# Patient Record
Sex: MALE | Race: WHITE | Employment: FULL TIME | URBAN - METROPOLITAN AREA
[De-identification: names, ages, dates, MRNs, and addresses within clinical notes are randomized per-mention and may not be internally consistent; named-entity substitution may affect disease eponyms.]

---

## 2017-07-07 ENCOUNTER — OFFICE VISIT (OUTPATIENT)
Dept: ORTHOPEDIC SURGERY | Age: 57
End: 2017-07-07

## 2017-07-07 VITALS
OXYGEN SATURATION: 97 % | RESPIRATION RATE: 18 BRPM | WEIGHT: 301 LBS | BODY MASS INDEX: 40.77 KG/M2 | TEMPERATURE: 98.2 F | HEART RATE: 80 BPM | DIASTOLIC BLOOD PRESSURE: 94 MMHG | SYSTOLIC BLOOD PRESSURE: 166 MMHG | HEIGHT: 72 IN

## 2017-07-07 DIAGNOSIS — M25.552 LEFT HIP PAIN: Primary | ICD-10-CM

## 2017-07-07 DIAGNOSIS — M51.36 DDD (DEGENERATIVE DISC DISEASE), LUMBAR: ICD-10-CM

## 2017-07-07 DIAGNOSIS — R29.898 LEFT LEG WEAKNESS: ICD-10-CM

## 2017-07-07 DIAGNOSIS — M79.2 NEURITIS: ICD-10-CM

## 2017-07-07 DIAGNOSIS — M47.816 LUMBAR FACET ARTHROPATHY: ICD-10-CM

## 2017-07-07 DIAGNOSIS — M54.50 PAIN OF LUMBAR SPINE: ICD-10-CM

## 2017-07-07 RX ORDER — GABAPENTIN 300 MG/1
CAPSULE ORAL
Qty: 120 CAP | Refills: 2 | Status: SHIPPED | OUTPATIENT
Start: 2017-07-07

## 2017-07-07 NOTE — PROGRESS NOTES
[facility]  [address] [place], [facility] [address]  Phone: [phone]  Fax: [phone]      ASSESSMENT   Marvin Erwin was seen today for back pain. Diagnoses and all orders for this visit:    Left hip pain  -     [74680] Hip Uni with Pelvis 2-3 Views    Pain of lumbar spine  -     [49021] Hip Uni with Pelvis 2-3 Views    DDD (degenerative disc disease), lumbar  -     MRI LUMB SPINE W WO CONT; Future    Lumbar facet arthropathy (HCC)  -     MRI LUMB SPINE W WO CONT; Future    Neuritis  -     MRI LUMB SPINE W WO CONT; Future  -     gabapentin (NEURONTIN) 300 mg capsule; Take 1-2 caps po q am and 2 caps po q pm as directed. Left leg weakness  -     MRI LUMB SPINE W WO CONT; Future         IMPRESSION AND PLAN:  Althea Kaiser is a 61 y.o. male with history of lumbar pain. He c/o pain in the lower back that radiates down the left leg to the foot. Pt admits that at his last office visit in 2009 he experienced pain in the right leg. He reports that occasionally his left knee feels like its going to give out while walking. Pt is currently taking Neurontin 300 mg 1 tab BID and ibuprofen 1 tab BID. 1) Pt was given information on lumbar arthritis exercises. 2) A lumbar MRI was ordered. 3) He will increased his Neurontin 300 mg to 2 tabs BID, tapering up as directed to better manage his symptoms. 4) I recommended the Pt try water exercise. 5) Pt received a Toradol injection in the office today. 6) I recommended the Pt try OTC shoe inserts. 7) Mr. Isabelle Lion has a reminder for a \"due or due soon\" health maintenance. I have asked that he contact his primary care provider, Efren Roblero MD, for follow-up on this health maintenance. 8)  demonstrated consistency with prescribing. 9) Pt will follow-up in 10 days. HISTORY OF PRESENT ILLNESS:  Althea Kaiser is a 61 y.o. male with history of lumbar pain. He was last seen on 10/06/2009.  He c/o pain in the lower back that radiates down the left leg to the foot. Pt admits that at his last office visit he experienced pain in the right leg. He reports that he occasionally experiences numbness in the right leg depending on how he is sitting. Pt reports that he was replacing 3 sections of a privacy fence about 3 weeks ago. He did not experience pain for 4 days but by the 5th day he experienced severe pain in the lower back that interfered with his sleep and ambulation. Pt reports that at that time he was ambulating with the assistance of a walker and was out of work for 5 days. His lower back pain is worse with activity. Pt states that he experienced severe left hip pain when walking from the parking lot of [facility] to the entrance. He then took a break prior to walk further and states that his pain improved. Pt admits that occasionally his left knee feels like its going to give out while walking. He denies any prior falls or injuries. Pt had prior surgery at L4-5 with Dr. Roger Brady to address a herniated disc. Pt has been taking Neurontin 300 mg BID with some relief but admits that he has a history of sleep apnea. He uses a CPAP machine at night and reports that he has been taking Neurontin since 2009. Pt also takes ibuprofen 800 mg 1 tab BID. Pt at this time desires to proceed with a lumbar MRI and medication evaluation. Pt was a smoker and quit in 1998. He works with [employer]. 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 Pain Scale: 3/10     PCP: Suellen Otero MD      Past Medical History:   Diagnosis Date    Atrial fibrillation ([address]     Cardiac echocardiogram 09/05/2014    EF 55%. No WMA. Mild LVH. Gr 1 DDfx. RVSP 20 mmHg. No significant valvular heart disease. Unchanged from study of 11/23/10.  Cardiac Holter monitor study 09/24/2016    Sinus rhythm. Avg HR 71 bpm (range  bpm).   One 4-beat run of SVT. No pause >2.1 secs.  Cardiac nuclear imaging test 03/15/2013    Mild, fixed inferobasal defect likely artifact. No WMA. EF 60%.  Essential hypertension, benign     Musculoskeletal disorder     ruptured disc    Obesity, unspecified     Other and unspecified hyperlipidemia     Other malaise and fatigue     Sleep apnea     cpap    Type II or unspecified type diabetes mellitus without mention of complication, not stated as uncontrolled     Unspecified sleep apnea         Social History     Social History    Marital status:      Spouse name: N/A    Number of children: N/A    Years of education: N/A     Occupational History    Not on file. Social History Main Topics    Smoking status: Former Smoker     Quit date: 8/25/1998    Smokeless tobacco: Not on file    Alcohol use Yes      Comment: occasional    Drug use: Not on file    Sexual activity: Not on file     Other Topics Concern    Not on file     Social History Narrative       Current Outpatient Prescriptions   Medication Sig Dispense Refill    gabapentin (NEURONTIN) 300 mg capsule Take 1-2 caps po q am and 2 caps po q pm as directed. 120 Cap 2    diltiazem CD (CARTIA XT) 240 mg ER capsule Take  by mouth daily.  metformin (GLUMETZA) 500 mg TG24 24 hour tablet Take 500 mg by mouth daily.  glimepiride (AMARYL) 4 mg tablet Take 4 mg by mouth every morning.  gabapentin (NEURONTIN) 300 mg tablet Take 300 mg by mouth two (2) times a day.  simvastatin (ZOCOR) 20 mg tablet Take 20 mg by mouth nightly.  ibuprofen (MOTRIN) 800 mg tablet Take 800 mg by mouth two (2) times daily as needed.  aspirin 81 mg tablet Take 81 mg by mouth daily.  lisinopril (PRINIVIL, ZESTRIL) 2.5 mg tablet Take 2.5 mg by mouth daily. No Known Allergies    REVIEW OF SYSTEMS    Constitutional: Negative for fever, chills, or weight change. Respiratory: Negative for cough or shortness of breath.      Cardiovascular: Negative for chest pain or palpitations. Gastrointestinal: Negative for acid reflux, change in bowel habits, or constipation. Genitourinary: Negative for dysuria and flank pain. Musculoskeletal: Positive for lumbar and right pain. Skin: Negative for rash. Neurological: Positive for intermittent numbness in the right leg. Negative for headaches dizziness. Endo/Heme/Allergies: Negative for increased bruising. Psychiatric/Behavioral: Negative for difficulty with sleep. PHYSICAL EXAMINATION  Visit Vitals    BP (!) 166/94    Pulse 80    Temp 98.2 °F (36.8 °C) (Oral)    Resp 18    Ht 6' (1.829 m)    Wt 301 lb (136.5 kg)    SpO2 97%    BMI 40.82 kg/m2       Constitutional: Awake, alert, and in no acute distress  HEENT: Normocephalic. Atraumatic. Oropharynx is moist and clear. PERRL. EOMI. Sclerae are nonicteric  Heart: Regular rate and rhythm  Lungs: Clear to auscultation bilaterally  Abdomen: Soft and nontender. Bowel sounds are present  Neurological: 1+ symmetrical DTRs in the upper extremities. 1+ symmetrical DTRs in the lower extremities. Sensation to light touch is intact. Negative Felicita's sign bilaterally. Skin: warm, dry, and intact. Musculoskeletal: Tightness across the upper trapezius. Good ROM in the cervical spine on all planes. Mild tenderness to palpation in the lower lumbar region. Moderate pain with extension, axial loading. Pain and limited ROM with internal rotation of his left hip. Good ROM with internal and external rotation of the right hip. Positive straight leg raise on the left. Pt slightly pronates on both sides.       Biceps  Triceps Deltoids Wrist Ext Wrist Flex Hand Intrin   Right +4/5 +4/5 +4/5 +4/5 +4/5 +4/5   Left +4/5 +4/5 +4/5 +4/5 +4/5 +4/5      Hip Flex  Quads Hamstrings Ankle DF EHL Ankle PF   Right 4/5 4/5 4/5 4/5 +4/5 4/5   Left -4/5 -4/5 -4/5 -4/5 +4/5 -4/5     IMAGING:    Hip x-rays from 07/07/2017 were personally reviewed with the Pt and demonstrated:  1) Mild degenerative findings in the left hip.  2) Moderate degenerative findings in the right hip. Lumbar spine x-rays from 01/20/2017 were personally reviewed with the Pt and demonstrated:  Findings: There is severe loss of disc space with circumferential marginal osteophytes and some facet arthropathy at L4-L5. The remaining disc spaces of the lumbar spine are unremarkable. No subluxations. Mild atherosclerotic calcification of the iliacs. IMPRESSION:  Significant degenerative changes at L4-L5 only. Written by Adriana Oquendo, as dictated by Anu Rivera MD.  I, Dr. Anu Rivera confirm that all documentation is accurate.

## 2017-07-07 NOTE — MR AVS SNAPSHOT
Visit Information Date & Time Provider Department Dept. Phone Encounter #  
 7/7/2017  1:15 PM Pedro Barrera MD [facility] [phone] [id number] Follow-up Instructions Return in about 10 days (around 7/17/2017) for Diagnostic Test follow up-- make early am appt. Upcoming Health Maintenance Date Due Hepatitis C Screening 1960 HEMOGLOBIN A1C Q6M 1960 FOOT EXAM Q1 10/23/1969 MICROALBUMIN Q1 10/23/1969 EYE EXAM RETINAL OR DILATED Q1 10/23/1969 Pneumococcal 19-64 Medium Risk (1 of 1 - PPSV23) 10/23/1978 DTaP/Tdap/Td series (1 - Tdap) 10/23/1980 FOBT Q 1 YEAR AGE 50-75 10/23/2009 LIPID PANEL Q1 11/23/2011 INFLUENZA AGE 9 TO ADULT 8/1/2017 Allergies as of 7/7/2017  Review Complete On: 7/7/2017 By: Zabrina Da Silva No Known Allergies Current Immunizations  Never Reviewed No immunizations on file. Not reviewed this visit You Were Diagnosed With   
  
 Codes Comments Left hip pain    -  Primary ICD-10-CM: M93.015 ICD-9-CM: 719.45 Pain of lumbar spine     ICD-10-CM: M54.5 ICD-9-CM: 724.2 DDD (degenerative disc disease), lumbar     ICD-10-CM: M51.36 
ICD-9-CM: 722.52 Lumbar facet arthropathy (HCC)     ICD-10-CM: M12.88 ICD-9-CM: 721.3 Neuritis     ICD-10-CM: M79.2 ICD-9-CM: 729.2 Left leg weakness     ICD-10-CM: R29.898 ICD-9-CM: 729.89 Vitals BP Pulse Temp Resp Height(growth percentile) Weight(growth percentile) (!) 166/94 80 98.2 °F (36.8 °C) (Oral) 18 6' (1.829 m) 301 lb (136.5 kg) SpO2 BMI Smoking Status 97% 40.82 kg/m2 Former Smoker BMI and BSA Data Body Mass Index Body Surface Area  
 40.82 kg/m 2 2.63 m 2 Preferred Pharmacy Pharmacy Name Phone [address], Henrry Ruffin [address] [place] [address] [phone]  [facility] [phone] Your Updated Medication List  
  
   
 This list is accurate as of: 7/7/17  2:23 PM.  Always use your most recent med list.  
  
  
  
  
 aspirin 81 mg tablet Take 81 mg by mouth daily. CARTIA  mg ER capsule Generic drug:  dilTIAZem CD Take  by mouth daily. * gabapentin 300 mg tablet Commonly known as:  NEURONTIN Take 300 mg by mouth two (2) times a day. * gabapentin 300 mg capsule Commonly known as:  NEURONTIN Take 1-2 caps po q am and 2 caps po q pm as directed. glimepiride 4 mg tablet Commonly known as:  AMARYL Take 4 mg by mouth every morning. GLUMETZA 500 mg Tg24 24 hour tablet Generic drug:  metFORMIN Take 500 mg by mouth daily. ibuprofen 800 mg tablet Commonly known as:  MOTRIN Take 800 mg by mouth two (2) times daily as needed. lisinopril 2.5 mg tablet Commonly known as:  Pippa Bills Take 2.5 mg by mouth daily. simvastatin 20 mg tablet Commonly known as:  ZOCOR Take 20 mg by mouth nightly. * Notice: This list has 2 medication(s) that are the same as other medications prescribed for you. Read the directions carefully, and ask your doctor or other care provider to review them with you. Prescriptions Sent to Pharmacy Refills  
 gabapentin (NEURONTIN) 300 mg capsule 2 Sig: Take 1-2 caps po q am and 2 caps po q pm as directed. Class: Normal  
 Pharmacy: [address] [place] [address] #: [phone] We Performed the Following AMB POC X-RAY RADEX HIP UNI WITH PELVIS 2-3 VIEWS [08773 CPT(R)] Follow-up Instructions Return in about 10 days (around 7/17/2017) for Diagnostic Test follow up-- make early am appt. To-Do List   
 07/07/2017 Imaging:  MRI LUMB SPINE W WO CONT   
  
 07/18/2017 1:15 PM  
  Appointment with [facility] MRI RM 2 at [facility] ([phone])  GENERAL INSTRUCTIONS  Bring information (ID card) if you have any medically implanted devices. You will be required to lie still while the procedure is being performed. Remove any jewelry (including body piercing, hairpins) prior to MRI. If you have had a creatinine level drawn within the past 30 days, please bring most recent results to your appt. Bring any films, CD's, and reports related to your study with you on the day of your exam.  This only includes studies done outside of [address] [place] [address], [facility], [facility], and [facility]. Bring a complete list of all medications you are currently taking to include prescriptions, over-the-counter meds, herbals, vitamins & any dietary supplements. If you were given medications for claustrophobia or anxiety, please arrange to have someone drive you to your appointment. QUESTIONS  Notify the MRI Department if you have any questions concerning your study. [facility]ill[facility] Chloe - [phone] [address] [facility] - [phone] Patient Instructions Low Back Arthritis: Exercises Your Care Instructions Here are some examples of typical rehabilitation exercises for your condition. Start each exercise slowly. Ease off the exercise if you start to have pain. Your doctor or physical therapist will tell you when you can start these exercises and which ones will work best for you. When you are not being active, find a comfortable position for rest. Some people are comfortable on the floor or a medium-firm bed with a small pillow under their head and another under their knees. Some people prefer to lie on their side with a pillow between their knees. Don't stay in one position for too long. Take short walks (10 to 20 minutes) every 2 to 3 hours. Avoid slopes, hills, and stairs until you feel better. Walk only distances you can manage without pain, especially leg pain. How to do the exercises Pelvic tilt 1. Lie on your back with your knees bent. 2. \"Brace\" your stomachtighten your muscles by pulling in and imagining your belly button moving toward your spine. 3. Press your lower back into the floor. You should feel your hips and pelvis rock back. 4. Hold for 6 seconds while breathing smoothly. 5. Relax and allow your pelvis and hips to rock forward. 6. Repeat 8 to 12 times. Back stretches 1. Get down on your hands and knees on the floor. 2. Relax your head and allow it to droop. Round your back up toward the ceiling until you feel a nice stretch in your upper, middle, and lower back. Hold this stretch for as long as it feels comfortable, or about 15 to 30 seconds. 3. Return to the starting position with a flat back while you are on your hands and knees. 4. Let your back sway by pressing your stomach toward the floor. Lift your buttocks toward the ceiling. 5. Hold this position for 15 to 30 seconds. 6. Repeat 2 to 4 times. Follow-up care is a key part of your treatment and safety. Be sure to make and go to all appointments, and call your doctor if you are having problems. It's also a good idea to know your test results and keep a list of the medicines you take. Where can you learn more? Go to http://jimbo-faith.info/. Enter E934 in the search box to learn more about \"Low Back Arthritis: Exercises. \" Current as of: March 21, 2017 Content Version: 11.3 © 4025-6467 [employer]. Care instructions adapted under license by [employer] (which disclaims liability or warranty for this information). If you have questions about a medical condition or this instruction, always ask your healthcare professional. [place] [address] any warranty or liability for your use of this information. Introducing [facility]! [address] introduces [employer] patient portal. Now you can access parts of your medical record, email your doctor's office, and request medication refills online. 1. In your internet browser, go to [email or web address] 2. Click on the First Time User? Click Here link in the Sign In box. You will see the New Member Sign Up page. 3. Enter your AppGratis Access Code exactly as it appears below. You will not need to use this code after youve completed the sign-up process. If you do not sign up before the expiration date, you must request a new code. · App[employer] Access Code: [email or web address] Expires: 10/5/2017  1:11 PM 
 
4. Enter the last four digits of your Social Security Number (xxxx) and Date of Birth (mm/dd/yyyy) as indicated and click Submit. You will be taken to the next sign-up page. 5. Create a Wummelboxt ID. This will be your AppGratis login ID and cannot be changed, so think of one that is secure and easy to remember. 6. Create a [employer] password. You can change your password at any time. 7. Enter your Password Reset Question and Answer. This can be used at a later time if you forget your password. 8. Enter your e-mail address. You will receive e-mail notification when new information is available in [address] 9. Click Sign Up. You can now view and download portions of your medical record. 10. Click the Download Summary menu link to download a portable copy of your medical information. If you have questions, please visit the Frequently Asked Questions section of the [employer] website. Remember, [employer] is NOT to be used for urgent needs. For medical emergencies, dial 911. Now available from your iPhone and Android! Please provide this summary of care documentation to your next provider. Your primary care clinician is listed as Dragan Mccain. If you have any questions after today's visit, please call [phone].

## 2017-07-07 NOTE — PATIENT INSTRUCTIONS
Low Back Arthritis: Exercises  Your Care Instructions  Here are some examples of typical rehabilitation exercises for your condition. Start each exercise slowly. Ease off the exercise if you start to have pain. Your doctor or physical therapist will tell you when you can start these exercises and which ones will work best for you. When you are not being active, find a comfortable position for rest. Some people are comfortable on the floor or a medium-firm bed with a small pillow under their head and another under their knees. Some people prefer to lie on their side with a pillow between their knees. Don't stay in one position for too long. Take short walks (10 to 20 minutes) every 2 to 3 hours. Avoid slopes, hills, and stairs until you feel better. Walk only distances you can manage without pain, especially leg pain. How to do the exercises  Pelvic tilt    1. Lie on your back with your knees bent. 2. \"Brace\" your stomachtighten your muscles by pulling in and imagining your belly button moving toward your spine. 3. Press your lower back into the floor. You should feel your hips and pelvis rock back. 4. Hold for 6 seconds while breathing smoothly. 5. Relax and allow your pelvis and hips to rock forward. 6. Repeat 8 to 12 times. Back stretches    1. Get down on your hands and knees on the floor. 2. Relax your head and allow it to droop. Round your back up toward the ceiling until you feel a nice stretch in your upper, middle, and lower back. Hold this stretch for as long as it feels comfortable, or about 15 to 30 seconds. 3. Return to the starting position with a flat back while you are on your hands and knees. 4. Let your back sway by pressing your stomach toward the floor. Lift your buttocks toward the ceiling. 5. Hold this position for 15 to 30 seconds. 6. Repeat 2 to 4 times. Follow-up care is a key part of your treatment and safety.  Be sure to make and go to all appointments, and call your doctor if you are having problems. It's also a good idea to know your test results and keep a list of the medicines you take. Where can you learn more? Go to http://jimbo-faith.info/. Enter C791 in the search box to learn more about \"Low Back Arthritis: Exercises. \"  Current as of: March 21, 2017  Content Version: 11.3  © 3227-3435 [employer]. Care instructions adapted under license by [employer] (which disclaims liability or warranty for this information). If you have questions about a medical condition or this instruction, always ask your healthcare professional. [place] [address] any warranty or liability for your use of this information.

## 2017-07-14 ENCOUNTER — DOCUMENTATION ONLY (OUTPATIENT)
Dept: ORTHOPEDIC SURGERY | Age: 57
End: 2017-07-14

## 2017-07-14 NOTE — PROGRESS NOTES
Patient's insurance changed his MRI to [facility].  MRI order has been faxed, [id number], fax [phone]

## 2017-07-31 ENCOUNTER — HOSPITAL ENCOUNTER (OUTPATIENT)
Age: 57
Discharge: HOME | End: 2017-07-31
Attending: PHYSICAL MEDICINE & REHABILITATION
Payer: COMMERCIAL

## 2017-07-31 DIAGNOSIS — M47.816 LUMBAR FACET ARTHROPATHY: ICD-10-CM

## 2017-07-31 DIAGNOSIS — M79.2 NEURITIS: ICD-10-CM

## 2017-07-31 DIAGNOSIS — M51.36 DDD (DEGENERATIVE DISC DISEASE), LUMBAR: ICD-10-CM

## 2017-07-31 DIAGNOSIS — R29.898 LEFT LEG WEAKNESS: ICD-10-CM

## 2017-07-31 PROCEDURE — A9585 GADOBUTROL INJECTION: HCPCS | Performed by: PHYSICAL MEDICINE & REHABILITATION

## 2017-07-31 PROCEDURE — 82565 ASSAY OF CREATININE: CPT

## 2017-07-31 PROCEDURE — 74011250636 HC RX REV CODE- 250/636: Performed by: PHYSICAL MEDICINE & REHABILITATION

## 2017-07-31 PROCEDURE — 72158 MRI LUMBAR SPINE W/O & W/DYE: CPT

## 2017-07-31 RX ADMIN — GADOBUTROL 13.5 ML: 604.72 INJECTION INTRAVENOUS at 19:00

## 2017-08-02 LAB — CREAT UR-MCNC: 0.9 MG/DL (ref 0.6–1.3)

## 2017-09-18 ENCOUNTER — OFFICE VISIT (OUTPATIENT)
Dept: ORTHOPEDIC SURGERY | Age: 57
End: 2017-09-18

## 2017-09-18 VITALS
OXYGEN SATURATION: 96 % | DIASTOLIC BLOOD PRESSURE: 75 MMHG | SYSTOLIC BLOOD PRESSURE: 153 MMHG | RESPIRATION RATE: 18 BRPM | WEIGHT: 300 LBS | BODY MASS INDEX: 40.69 KG/M2 | HEART RATE: 77 BPM | TEMPERATURE: 98.3 F

## 2017-09-18 DIAGNOSIS — M51.36 ANNULAR TEAR OF LUMBAR DISC: ICD-10-CM

## 2017-09-18 DIAGNOSIS — M47.816 LUMBAR FACET ARTHROPATHY: Primary | ICD-10-CM

## 2017-09-18 DIAGNOSIS — M79.2 NEURITIS: ICD-10-CM

## 2017-09-18 RX ORDER — TOPIRAMATE 25 MG/1
TABLET ORAL
Qty: 90 TAB | Refills: 1 | Status: SHIPPED | OUTPATIENT
Start: 2017-09-18 | End: 2017-11-08 | Stop reason: SDUPTHER

## 2017-09-18 RX ORDER — TOPIRAMATE 25 MG/1
TABLET ORAL
Qty: 90 TAB | Refills: 1 | Status: SHIPPED | OUTPATIENT
Start: 2017-09-18 | End: 2017-09-18 | Stop reason: SDUPTHER

## 2017-09-18 RX ORDER — METFORMIN HYDROCHLORIDE 500 MG/1
500 TABLET, EXTENDED RELEASE ORAL DAILY
COMMUNITY
Start: 2017-06-24 | End: 2017-11-28 | Stop reason: SDUPTHER

## 2017-09-18 NOTE — PROGRESS NOTES
[facility]  [address] [place], [facility] [address]  Phone: [phone]  Fax: [phone]      ASSESSMENT   Diagnoses and all orders for this visit:    1. Lumbar facet arthropathy  -     REFERRAL TO PHYSICAL THERAPY    2. Neuritis  -     topiramate (TOPAMAX) 25 mg tablet; Take 1 in the evening for 1 week, then increase to 2 the second week and continue with 3 in the evening  -     REFERRAL TO PHYSICAL THERAPY    3. Annular tear of lumbar disc  -     REFERRAL TO PHYSICAL THERAPY         IMPRESSION AND PLAN:  Andreea Mon is a 61 y.o. male with history of lumbar pain. Pt c/o pain in the lower back and intermittent pain radiating down the left leg. Of note, Pt had a L4-L5 laminectomy with Dr. Amy Saeed in the last 1990's. 1) Pt was given information on lumbar arthritis exercises. 2) Discussed treatment options with the Pt, including steroid injections, a RFA, and medication. 3) I recommended the Pt try water exercise. 4) I encouraged the Pt to lose weight and suggested he follow up with Dr. Malathi Lea for weight loss options. 5) Pt was referred to [facility] physical therapy. 6) He will taper off his Neurontin 300 mg night time dose and take Topamax in place to better manage his symptoms. 7) Pt was prescribed Topamax 25 mg 2 tabs QHS, tapering up as directed to take in place of the Neurontin at night. 7) Mr. Walter Hernandez has a reminder for a \"due or due soon\" health maintenance. I have asked that he contact his primary care provider, Immanuel Isbell MD, for follow-up on this health maintenance. 8)  demonstrated consistency with prescribing. 9) Pt will follow-up in 6 weeks. HISTORY OF PRESENT ILLNESS:  Andreea Mon is a 61 y.o. male with history of lumbar pain. Pt presents to the office today for lumbar MRI follow up. He c/o pain in the lower back, hips, and knees. Pt reports intermittent pain radiating down the left leg.  He plans to go on vacation to [place], [place] this Sunday and admits that he has been taking it easy. Pt reports that he works as a  but states that he experiences pain with the prolonged sitting. He reports pain when bending, stooping, and lifting. Of note, He used to work as a  in [place]. Pt admits that he used to like walking for exercise but recently his hips and knees have been popping after walking about 1/2 mile. He has not recently tried physical therapy. Pt states that he had difficulty scheduling the lumbar MRI since he had a right middle ear implant in 2001. His insurance wanted to send him to a private [address] [place] [address] but they would not schedule the MRI since they did not know the lot number or his ear implant. Of note, Pt had a L4-L5 laminectomy with Dr. Rafael Gale in the last 1990's. Pt has been prescribed Neurontin 300 mg and takes 1 tab BID with some relief. He admits to some sedation when taking the Neurontin and occasionally takes 2 tabs BID when his pain is severe. Pt reports that he experiences anxiety when he misses a dose of Neurontin. He also takes ibuprofen 800 mg BID and denies any stomach issues. Pt is diabetic and reports that his blood sugars are well controlled a this time. He denies trying Topamax and has no history of glaucoma or renal stones. Pt at this time desires to proceed with medication evaluation and a referral to [facility] physical therapy. Pt quit smoking in 1998. Pain Scale: 3/10    PCP: Aristides Garsia MD       Past Medical History:   Diagnosis Date    Atrial fibrillation ([facility] [address]     Cardiac echocardiogram 09/05/2014    EF 55%. No WMA. Mild LVH. Gr 1 DDfx. RVSP 20 mmHg. No significant valvular heart disease. Unchanged from study of 11/23/10.  Cardiac Holter monitor study 09/24/2016    Sinus rhythm. Avg HR 71 bpm (range  bpm). One 4-beat run of SVT. No pause >2.1 secs.     Cardiac nuclear imaging test 03/15/2013    Mild, fixed inferobasal defect likely artifact. No WMA. EF 60%.  Essential hypertension, benign     Musculoskeletal disorder     ruptured disc    Obesity, unspecified     Other and unspecified hyperlipidemia     Other malaise and fatigue     Sleep apnea     cpap    Type II or unspecified type diabetes mellitus without mention of complication, not stated as uncontrolled     Unspecified sleep apnea         Social History     Social History    Marital status:      Spouse name: N/A    Number of children: N/A    Years of education: N/A     Occupational History    Not on file. Social History Main Topics    Smoking status: Former Smoker     Quit date: 8/25/1998    Smokeless tobacco: Former User    Alcohol use Yes      Comment: occasional    Drug use: Not on file    Sexual activity: Not on file     Other Topics Concern    Not on file     Social History Narrative       Current Outpatient Prescriptions   Medication Sig Dispense Refill    metFORMIN ER (GLUCOPHAGE XR) 500 mg tablet Take 500 mg by mouth daily.  topiramate (TOPAMAX) 25 mg tablet Take 1 in the evening for 1 week, then increase to 2 the second week and continue with 3 in the evening 90 Tab 1    gabapentin (NEURONTIN) 300 mg capsule Take 1-2 caps po q am and 2 caps po q pm as directed. 120 Cap 2    diltiazem CD (CARTIA XT) 240 mg ER capsule Take  by mouth daily.  metformin (GLUMETZA) 500 mg TG24 24 hour tablet Take 500 mg by mouth daily.  glimepiride (AMARYL) 4 mg tablet Take 4 mg by mouth every morning.  gabapentin (NEURONTIN) 300 mg tablet Take 300 mg by mouth two (2) times a day.  simvastatin (ZOCOR) 20 mg tablet Take 20 mg by mouth nightly.  ibuprofen (MOTRIN) 800 mg tablet Take 800 mg by mouth two (2) times daily as needed.  aspirin 81 mg tablet Take 81 mg by mouth daily.  lisinopril (PRINIVIL, ZESTRIL) 2.5 mg tablet Take 2.5 mg by mouth daily.          No Known Allergies      REVIEW OF SYSTEMS    Constitutional: Negative for fever, chills, or weight change. Respiratory: Negative for cough or shortness of breath. Cardiovascular: Negative for chest pain or palpitations. Gastrointestinal: Negative for acid reflux, change in bowel habits, or constipation. Genitourinary: Negative for dysuria and flank pain. Musculoskeletal: Positive for lumbar pain. Skin: Negative for rash. Neurological: Negative for headaches, dizziness, or numbness. Endo/Heme/Allergies: Negative for increased bruising. Psychiatric/Behavioral: Negative for difficulty with sleep. PHYSICAL EXAMINATION  Visit Vitals    /75    Pulse 77    Temp 98.3 °F (36.8 °C)    Resp 18    Wt 300 lb (136.1 kg)    SpO2 96%    BMI 40.69 kg/m2       Constitutional: Awake, alert, and in no acute distress  Neurological: 1+ symmetrical DTRs in the lower extremities. Sensation to light touch is intact. Skin: warm, dry, and intact. Musculoskeletal: Tenderness to palpation in the lower lumbar region. No pain with extension or axial loading. No different with forward flexion. Moderate pain with internal rotation of his left hip. Negative straight leg raise bilaterally. Hip Flex  Quads Hamstrings Ankle DF EHL Ankle PF   Right +4/5 +4/5 +4/5 +4/5 +4/5 +4/5   Left +4/5 +4/5 +4/5 +4/5 +4/5 +4/5     IMAGING:    Lumbar spine MRI from 07/31/2017 was personally reviewed with the patient and demonstrated:    Results from [place] encounter on 07/31/17   MRI LUMB SPINE W WO CONT   Narrative Sagittal and axial multisequence MR images of lumbar spine were obtained without  and with 13.5 cc Gadavist gadolinium IV contrast.    HISTORY: Chronic lower back pain and left leg pain. Previous laminectomy in  1990s    COMPARISON: MRI October 2, 2009    Trace retrolisthesis of L5 on S1, slightly more conspicuous than before. Significant loss of discal height with endplate irregularity at L4-L5, with  spondylosis.  Mild discogenic endplate fatty changes and edema at L4-L5. No  compression fracture or pathologic marrow signal otherwise. Conus medullaris  ends at L1 with normal morphology and signal intensity. No abnormal enhancement. T12 and L1, L1-L2 and L2-L3: No disc herniation, central or foraminal stenosis. Maintained normal discal height and signal intensity. No disc desiccation. L3-L4: Mild left posterolateral disc protrusion. No central stenosis. Mild left  facet and ligamentous hypertrophy. No significant foraminal stenosis. No nerve  root compression. L4-L5: Left laminotomy. No significant distortion of the thecal sac. No  arachnoiditis. Mild posterior disc bulge with endplate spondylosis. No central  stenosis. Facet hypertrophy with mild bilateral foraminal narrowing but no  exiting nerve root compression. Slightly prominent anterior epidural fat with  mild indentation to the left anterior lateral thecal sac. L5-S1: Mild posterior disc bulge with left posterior lateral annular tear. No  central stenosis. Facet and ligamentous hypertrophy with moderate left foraminal  stenosis. Mild compression of left exiting L5 nerve root, but unchanged from  2009. Impression IMPRESSION:  1. Left L5 foraminal stenosis with mild compression of the left exiting L5 nerve  root, but unchanged from 2009. Left posterior lateral annular tear at L5-S1  which is more conspicuous than before. 2. Prominent anterior epidural fat at L4-L5, with mild indentation to the left  anterolateral thecal sac. Disc disease at L4-L5 with spondylolisthesis otherwise  stable. 3. Mild left posterolateral disc protrusion L3-L4 with no significant mass  effect. Hip x-rays from 07/07/2017 were personally reviewed with the Pt and demonstrated:  1) Mild degenerative findings in the left hip.  2) Moderate degenerative findings in the right hip.     Written by Carlene Moss, as dictated by Thea Frausto MD.  I, Dr. Thea Frausto confirm that all documentation is accurate.

## 2017-09-18 NOTE — MR AVS SNAPSHOT
Visit Information Date & Time Provider Department Dept. Phone Encounter #  
 9/18/2017 10:30 AM Tameka Hooks MD [facility] [phone] [id number] Follow-up Instructions Return in about 6 weeks (around 10/30/2017) for PT follow up, Medication follow up. Upcoming Health Maintenance Date Due Hepatitis C Screening 1960 HEMOGLOBIN A1C Q6M 1960 FOOT EXAM Q1 10/23/1969 MICROALBUMIN Q1 10/23/1969 EYE EXAM RETINAL OR DILATED Q1 10/23/1969 Pneumococcal 19-64 Medium Risk (1 of 1 - PPSV23) 10/23/1978 DTaP/Tdap/Td series (1 - Tdap) 10/23/1980 FOBT Q 1 YEAR AGE 50-75 10/23/2009 LIPID PANEL Q1 11/23/2011 INFLUENZA AGE 9 TO ADULT 8/1/2017 Allergies as of 9/18/2017  Review Complete On: 9/18/2017 By: Tameka Hooks MD  
 No Known Allergies Current Immunizations  Never Reviewed No immunizations on file. Not reviewed this visit You Were Diagnosed With   
  
 Codes Comments Lumbar facet arthropathy    -  Primary ICD-10-CM: M12.88 ICD-9-CM: 721.3 Neuritis     ICD-10-CM: M79.2 ICD-9-CM: 729.2 Annular tear of lumbar disc     ICD-10-CM: M51.36 
ICD-9-CM: 722.52 Vitals BP Pulse Temp Resp Weight(growth percentile) SpO2  
 153/75 77 98.3 °F (36.8 °C) 18 300 lb (136.1 kg) 96% BMI Smoking Status 40.69 kg/m2 Former Smoker BMI and BSA Data Body Mass Index Body Surface Area  
 40.69 kg/m 2 2.63 m 2 Preferred Pharmacy Pharmacy Name Phone [address], [address] [facility] [phone] Your Updated Medication List  
  
   
This list is accurate as of: 9/18/17 12:30 PM.  Always use your most recent med list.  
  
  
  
  
 aspirin 81 mg tablet Take 81 mg by mouth daily. CARTIA  mg ER capsule Generic drug:  dilTIAZem CD Take  by mouth daily. * gabapentin 300 mg tablet Commonly known as:  NEURONTIN Take 300 mg by mouth two (2) times a day. * gabapentin 300 mg capsule Commonly known as:  NEURONTIN Take 1-2 caps po q am and 2 caps po q pm as directed. glimepiride 4 mg tablet Commonly known as:  AMARYL Take 4 mg by mouth every morning. * GLUMETZA 500 mg Tg24 24 hour tablet Generic drug:  metFORMIN Take 500 mg by mouth daily. * metFORMIN  mg tablet Commonly known as:  GLUCOPHAGE XR Take 500 mg by mouth daily. ibuprofen 800 mg tablet Commonly known as:  MOTRIN Take 800 mg by mouth two (2) times daily as needed. lisinopril 2.5 mg tablet Commonly known as:  Jojo Dowdy Take 2.5 mg by mouth daily. simvastatin 20 mg tablet Commonly known as:  ZOCOR Take 20 mg by mouth nightly. topiramate 25 mg tablet Commonly known as:  TOPAMAX Take 1 in the evening for 1 week, then increase to 2 the second week and continue with 3 in the evening * Notice: This list has 4 medication(s) that are the same as other medications prescribed for you. Read the directions carefully, and ask your doctor or other care provider to review them with you. Prescriptions Printed Refills  
 topiramate (TOPAMAX) 25 mg tablet 1 Sig: Take 1 in the evening for 1 week, then increase to 2 the second week and continue with 3 in the evening Class: Print We Performed the Following REFERRAL TO PHYSICAL THERAPY [DSW56 Custom] Comments:  
 [facility] Follow-up Instructions Return in about 6 weeks (around 10/30/2017) for PT follow up, Medication follow up. Referral Information Referral ID Referred By Referred To  
  
 [id number] Jose Reyez Not Available Visits Status Start Date End Date 1 New Request 9/18/17 9/18/18  If your referral has a status of pending review or denied, additional information will be sent to support the outcome of this decision. Patient Instructions Low Back Arthritis: Exercises Your Care Instructions Here are some examples of typical rehabilitation exercises for your condition. Start each exercise slowly. Ease off the exercise if you start to have pain. Your doctor or physical therapist will tell you when you can start these exercises and which ones will work best for you. When you are not being active, find a comfortable position for rest. Some people are comfortable on the floor or a medium-firm bed with a small pillow under their head and another under their knees. Some people prefer to lie on their side with a pillow between their knees. Don't stay in one position for too long. Take short walks (10 to 20 minutes) every 2 to 3 hours. Avoid slopes, hills, and stairs until you feel better. Walk only distances you can manage without pain, especially leg pain. How to do the exercises Pelvic tilt 1. Lie on your back with your knees bent. 2. \"Brace\" your stomachtighten your muscles by pulling in and imagining your belly button moving toward your spine. 3. Press your lower back into the floor. You should feel your hips and pelvis rock back. 4. Hold for 6 seconds while breathing smoothly. 5. Relax and allow your pelvis and hips to rock forward. 6. Repeat 8 to 12 times. Back stretches 1. Get down on your hands and knees on the floor. 2. Relax your head and allow it to droop. Round your back up toward the ceiling until you feel a nice stretch in your upper, middle, and lower back. Hold this stretch for as long as it feels comfortable, or about 15 to 30 seconds. 3. Return to the starting position with a flat back while you are on your hands and knees. 4. Let your back sway by pressing your stomach toward the floor. Lift your buttocks toward the ceiling. 5. Hold this position for 15 to 30 seconds. 6. Repeat 2 to 4 times. Follow-up care is a key part of your treatment and safety. Be sure to make and go to all appointments, and call your doctor if you are having problems. It's also a good idea to know your test results and keep a list of the medicines you take. Where can you learn more? Go to http://jimbo-faith.info/. Enter Y462 in the search box to learn more about \"Low Back Arthritis: Exercises. \" Current as of: March 21, 2017 Content Version: 11.3 © 7626-9338 [employer]. Care instructions adapted under license by [employer] (which disclaims liability or warranty for this information). If you have questions about a medical condition or this instruction, always ask your healthcare professional. [address] any warranty or liability for your use of this information. Introducing \[facility]\"" & HEALTH SERVICES! [employer] introduces [employer] patient portal. Now you can access parts of your medical record, email your doctor's office, and request medication refills online. 1. In your internet browser, go to [email or web address] 2. Click on the First Time User? Click Here link in the Sign In box. You will see the New Member Sign Up page. 3. Enter your SWYF Access Code exactly as it appears below. You will not need to use this code after youve completed the sign-up process. If you do not sign up before the expiration date, you must request a new code. · SWYF Access Code: [email or web address] Expires: 10/5/2017  1:11 PM 
 
4. Enter the last four digits of your Social Security Number (xxxx) and Date of Birth (mm/dd/yyyy) as indicated and click Submit. You will be taken to the next sign-up page. 5. Create a Affinet ID. This will be your SWYF login ID and cannot be changed, so think of one that is secure and easy to remember. 6. Create a [employer] password. You can change your password at any time. 7. Enter your Password Reset Question and Answer. This can be used at a later time if you forget your password. 8. Enter your e-mail address. You will receive e-mail notification when new information is available in [address] 9. Click Sign Up. You can now view and download portions of your medical record. 10. Click the Download Summary menu link to download a portable copy of your medical information. If you have questions, please visit the Frequently Asked Questions section of the Swivel website. Remember, [employer]ivel is NOT to be used for urgent needs. For medical emergencies, dial 911. Now available from your iPhone and Android! Please provide this summary of care documentation to your next provider. Your primary care clinician is listed as Jesus Kamara. If you have any questions after today's visit, please call [phone].

## 2017-09-18 NOTE — PATIENT INSTRUCTIONS

## 2017-10-03 ENCOUNTER — HOSPITAL ENCOUNTER (OUTPATIENT)
Dept: PHYSICAL THERAPY | Age: 57
Discharge: HOME | End: 2017-10-03
Payer: COMMERCIAL

## 2017-10-03 PROCEDURE — 97535 SELF CARE MNGMENT TRAINING: CPT

## 2017-10-03 PROCEDURE — 97110 THERAPEUTIC EXERCISES: CPT

## 2017-10-03 PROCEDURE — 97162 PT EVAL MOD COMPLEX 30 MIN: CPT

## 2017-10-03 NOTE — PROGRESS NOTES
[facility]  [address]  [place], [address]  [phone] fax    Plan of Care/ Statement of Necessity for Physical Therapy Services    Patient name: Abdi Rivera Start of Care: 10/3/2017   Referral source: Solo Jasmine MD : 1960    Medical Diagnosis: Low back pain [M54.5]  Bilateral hip pain [M25.551, M25.552]   Onset Date:    Treatment Diagnosis: LBP and B hip pain   Prior Hospitalization: see medical history Provider#: [id number]   Medications: Verified on Patient summary List    Comorbidities: L4L5 ; DM; OA; visual/hearing impaired   Prior Level of Function: works as a ; difficulty with prolonged positions      The Plan of Care and following information is based on the information from the initial evaluation. Assessment/ key information: 61y.o. year old male presents with CC of LBP and B hip pain that is rather chronic in nature. Deficits include: poor L/S mobility and decrease tolerance in extension; poor TA and glute recruitment; decrease B hip strength. Patient will benefit from physical therapy to address deficits, and ultimately to return patient to prior level of function. Evaluation Complexity History MEDIUM  Complexity : 1-2 comorbidities / personal factors will impact the outcome/ POC ; Examination MEDIUM Complexity : 3 Standardized tests and measures addressing body structure, function, activity limitation and / or participation in recreation  ;Presentation MEDIUM Complexity : Evolving with changing characteristics  ; Clinical Decision Making MEDIUM Complexity : FOTO score of 26-74  Overall Complexity Rating: MEDIUM  Problem List: pain affecting function, decrease ROM, decrease strength, decrease ADL/ functional abilitiies, decrease activity tolerance and decrease flexibility/ joint mobility   Treatment Plan may include any combination of the following: Therapeutic exercise, Neuromuscular re-education, Physical agent/modality, Manual therapy and Patient education  Patient / Family readiness to learn indicated by: asking questions, trying to perform skills and interest  Persons(s) to be included in education: patient (P)  Barriers to Learning/Limitations: None  Patient Goal (s): manage pain  Patient Self Reported Health Status: fair  Rehabilitation Potential: fair    Short Term Goals: To be accomplished in 2 weeks:   1. I and compliant with HEP for self management of symptoms. Long Term Goals: To be accomplished in 4 weeks:   1. Improve FOTO to 50 to indicate improved function with daily activities. 2. Increase B hip ABD/EXT to grossly 4/5 to improve stability for standing. 3. Instruct patient in extensive HEP for pool. (pt has his own heated pool)    Frequency / Duration: Patient to be seen 2 times per week for 4 weeks. Patient/ Caregiver education and instruction: Diagnosis, prognosis, exercises   [x]  Plan of care has been reviewed with ROMEO Richard, PT 10/3/2017 9:15 AM    ________________________________________________________________________    I certify that the above Therapy Services are being furnished while the patient is under my care. I agree with the treatment plan and certify that this therapy is necessary.     [de-identified] Signature:____________________  Date:____________Time: _________    Please sign and return to [facility] [address] [address] [address]  [place], [address]  [phone] fax

## 2017-10-03 NOTE — PROGRESS NOTES
PT DAILY TREATMENT NOTE     Patient Name: Brad Hager  Date:10/3/2017  : 1960  [x]  Patient  Verified  Payor: Porsha Meyer / Plan: Wilfrido Medrano / Product Type: HMO /    In time:842  Out time:912  Total Treatment Time (min): 29  Visit #: 1 of 8    Treatment Area: Low back pain [M54.5]  Bilateral hip pain [M25.551, M25.552]    SUBJECTIVE  Pain Level (0-10 scale): 4  Any medication changes, allergies to medications, adverse drug reactions, diagnosis change, or new procedure performed?: [x] No    [] Yes (see summary sheet for update)  Subjective functional status/changes:   [] No changes reported  See eval    OBJECTIVE    10 min [x]Eval                  []Re-Eval       11 min Therapeutic Exercise:  [] See flow sheet :   Rationale: increase ROM and increase strength to improve the patients ability to increase ease with daily activities  Self-care: 8        With   [] TE   [] TA   [] neuro   [] other: Patient Education: [x] Review HEP    [] Progressed/Changed HEP based on:   [] positioning   [] body mechanics   [] transfers   [] heat/ice application    [] other:      Other Objective/Functional Measures:      Pain Level (0-10 scale) post treatment: 4    ASSESSMENT/Changes in Function: see POC    Patient will continue to benefit from skilled PT services to modify and progress therapeutic interventions, address functional mobility deficits, address ROM deficits, address strength deficits, analyze and address soft tissue restrictions, analyze and cue movement patterns and analyze and modify body mechanics/ergonomics to attain remaining goals. [x]  See Plan of Care  []  See progress note/recertification  []  See Discharge Summary         Progress towards goals / Updated goals:  Short Term Goals: To be accomplished in 2 weeks:                         1. I and compliant with HEP for self management of symptoms. Long Term Goals:  To be accomplished in 4 weeks:                         1. Improve FOTO to 50 to indicate improved function with daily activities. 2. Increase B hip ABD/EXT to grossly 4/5 to improve stability for standing.   3. Instruct patient in extensive HEP for pool. (pt has his own heated pool)    PLAN  []  Upgrade activities as tolerated     []  Continue plan of care  []  Update interventions per flow sheet       []  Discharge due to:_  []  Other:_      Ok Yee, PT 10/3/2017  9:22 AM    Future Appointments  Date Time Provider Fabian Delgadillo   10/4/2017 12:00 PM Lisbeth Zhengo [id number] [facility]   10/10/2017 10:30 AM [facility] PT PHYSICAL THERAPY 3 [facility]   10/12/2017 9:45 AM [facility] PT PHYSICAL THERAPY 3 [facility]   10/18/2017 10:00 AM Michael Saliva [facility]   10/20/2017 10:30 AM [place]wich Saliva [facility]   10/23/2017 10:00 AM Cary Johnson PTA [facility]   10/25/2017 10:00 AM Pearl Wilks [facility]   10/31/2017 10:45 AM Solomon Downey  [address]

## 2017-10-04 ENCOUNTER — HOSPITAL ENCOUNTER (OUTPATIENT)
Dept: PHYSICAL THERAPY | Age: 57
Discharge: HOME | End: 2017-10-04
Payer: COMMERCIAL

## 2017-10-04 PROCEDURE — 97113 AQUATIC THERAPY/EXERCISES: CPT

## 2017-10-04 NOTE — PROGRESS NOTES
PT DAILY TREATMENT NOTE 3-16    Patient Name: Helena Meeks  Date:10/4/2017  : 1960  [x]  Patient  Verified  Payor: Ana Lilia Tucker / Plan: Jarrod Palencia / Product Type: HMO /    In time:11:53  Out time:12:23  Total Treatment Time (min): 30  Visit #: 2 of 8    Treatment Area: Low back pain [M54.5]  Bilateral hip pain [M25.551, M25.552]    SUBJECTIVE  Pain Level (0-10 scale): 2  Any medication changes, allergies to medications, adverse drug reactions, diagnosis change, or new procedure performed?: [x] No    [] Yes (see summary sheet for update)  Subjective functional status/changes:   [] No changes reported  \"The pain is not that bad today. \"    OBJECTIVE  30 min Therapeutic Exercise:  [x] See flow sheet : aquatics   Rationale: increase ROM, increase strength and improve coordination to improve the patients ability to increase ease with ADLs           With   [] TE   [] TA   [] neuro   [] other: Patient Education: [x] Review HEP    [] Progressed/Changed HEP based on:   [] positioning   [] body mechanics   [] transfers   [] heat/ice application    [] other:      Other Objective/Functional Measures:   First follow up session--cues sequencing and correct form throughout     Pain Level (0-10 scale) post treatment: 4    ASSESSMENT/Changes in Function:   Initiated aquatics per flowsheet. Patient with poor core engagement and poor posture (fluctuating between increased spinal flexion or extension). Patient puts forth good effort with exercises; however, has not yet initiated HEP. Continue to progress activities emphasizing general body awareness for improved posture/exercise form.      Patient will continue to benefit from skilled PT services to modify and progress therapeutic interventions, address functional mobility deficits, address ROM deficits, address strength deficits, analyze and address soft tissue restrictions, analyze and cue movement patterns, analyze and modify body mechanics/ergonomics and assess and modify postural abnormalities to attain remaining goals. []  See Plan of Care  []  See progress note/recertification  []  See Discharge Summary            Short Term Goals: To be accomplished in 2 weeks:                         1. I and compliant with HEP for self management of symptoms. -not yet initiated (10/4/2017)  Long Term Goals: To be accomplished in 4 weeks:                         1. Improve FOTO to 50 to indicate improved function with daily activities. 2. Increase B hip ABD/EXT to grossly 4/5 to improve stability for standing.   3. Instruct patient in extensive HEP for pool. (pt has his own heated pool)    PLAN  []  Upgrade activities as tolerated     [x]  Continue plan of care  []  Update interventions per flow sheet       []  Discharge due to:_  []  Other:_      Riccardo Bud 10/4/2017  11:51 AM    Future Appointments  Date Time Provider Fabian Delgadillo   10/4/2017 12:00 PM Riccardo Bud [facility]   10/10/2017 10:30 AM [facility] PT PHYSICAL THERAPY 3 [facility]   10/12/2017 9:45 AM [facility] PT PHYSICAL THERAPY 3 [facility]   10/18/2017 10:00 AM Robert Lees [facility]   10/20/2017 10:30 AM Robert Lees [facility]   10/23/2017 10:00 AM Karely Mac, ROMEO [facility]   10/25/2017 10:00 AM Riccardo Bud [facility]   10/31/2017 10:45 AM Genie Castellon  [address]

## 2017-10-12 ENCOUNTER — HOSPITAL ENCOUNTER (OUTPATIENT)
Dept: PHYSICAL THERAPY | Age: 57
Discharge: HOME | End: 2017-10-12
Payer: COMMERCIAL

## 2017-10-12 PROCEDURE — 97113 AQUATIC THERAPY/EXERCISES: CPT

## 2017-10-18 ENCOUNTER — HOSPITAL ENCOUNTER (OUTPATIENT)
Dept: PHYSICAL THERAPY | Age: 57
Discharge: HOME | End: 2017-10-18
Payer: COMMERCIAL

## 2017-10-18 PROCEDURE — 97113 AQUATIC THERAPY/EXERCISES: CPT

## 2017-10-18 NOTE — PROGRESS NOTES
PT DAILY TREATMENT NOTE     Patient Name: Albin Bryan  Date:10/18/2017  : 1960  [x]  Patient  Verified  Payor: Abiola Diaz / Plan: Hetah Vu / Product Type: HMO /    In time:9:04am  Out time:9:34am  Total Treatment Time (min): 30  Visit #: 3 of 8    Treatment Area: Low back pain [M54.5]  Bilateral hip pain [M25.551, M25.552]    SUBJECTIVE  Pain Level (0-10 scale): 3  Any medication changes, allergies to medications, adverse drug reactions, diagnosis change, or new procedure performed?: [x] No    [] Yes (see summary sheet for update)  Subjective functional status/changes:   [x] No changes reported    OBJECTIVE  30 min Therapeutic Exercise:  [x] See flow sheet : AQUATICS   Rationale: increase ROM, increase strength and improve coordination to improve the patients ability to improve ADL ease. With   [] TE   [] TA   [] neuro   [] other: Patient Education: [x] Review HEP    [] Progressed/Changed HEP based on:   [] positioning   [] body mechanics   [] transfers   [] heat/ice application    [] other:      Other Objective/Functional Measures:      Pain Level (0-10 scale) post treatment: 2    ASSESSMENT/Changes in Function: Pt reports no pain provocation with interventions with fair technique, though occasionally requires cues to reduce lordotic posture. Patient will continue to benefit from skilled PT services to modify and progress therapeutic interventions, address functional mobility deficits, address ROM deficits, address strength deficits, analyze and address soft tissue restrictions, analyze and cue movement patterns, analyze and modify body mechanics/ergonomics and instruct in home and community integration to attain remaining goals.      []  See Plan of Care  []  See progress note/recertification  []  See Discharge Summary         Progress towards goals / Updated goals:  Short Term Goals: To be accomplished in 2 weeks:                         2. I and compliant with HEP for self management of symptoms. -not yet initiated (10/4/2017)  [address] be accomplished in 4 weeks:                         1. Improve FOTO to 50 to indicate improved function with daily activities. 2. Increase B hip ABD/EXT to grossly 4/5 to improve stability for standing.   3. Instruct patient in extensive HEP for pool. (pt has his own heated pool)    PLAN  []  Upgrade activities as tolerated     [x]  Continue plan of care  []  Update interventions per flow sheet       []  Discharge due to:_  []  Other:_      Max Notice, PT, DPT, ATC, CSCS 10/18/2017  9:08 AM    Future Appointments  Date Time Provider Fabian Leona   10/20/2017 10:30 AM [id number] [facility]   10/23/2017 10:00 AM Pennienas Burgos PTA [facility]   10/25/2017 10:00 AM Jeffery Zamudio [facility]   10/31/2017 10:45 AM Eduardo Momin  [facility]

## 2017-10-20 ENCOUNTER — HOSPITAL ENCOUNTER (OUTPATIENT)
Dept: PHYSICAL THERAPY | Age: 57
Discharge: HOME | End: 2017-10-20
Payer: COMMERCIAL

## 2017-10-20 PROCEDURE — 97113 AQUATIC THERAPY/EXERCISES: CPT

## 2017-10-20 NOTE — PROGRESS NOTES
PT DAILY TREATMENT NOTE     Patient Name: Citlalli Chauhan  Date:10/20/2017  : 1960  [x]  Patient  Verified  Payor: Rosario Meza / Plan: Charlotte Zamudio / Product Type: HMO /    In time:10:28  Out time:10:56  Total Treatment Time (min): 28  Visit #: 5 of 8    Treatment Area: Low back pain [M54.5]  Bilateral hip pain [M25.551, M25.552]    SUBJECTIVE  Pain Level (0-10 scale): 5  Any medication changes, allergies to medications, adverse drug reactions, diagnosis change, or new procedure performed?: [x] No    [] Yes (see summary sheet for update)  Subjective functional status/changes:   [] No changes reported  \"A little sore from my last session\"    OBJECTIVE    28 min Therapeutic Exercise:  [] See flow sheet :aquatics   Rationale: increase ROM, increase strength and improve coordination to improve the patients ability to perform daily tasks and ADLs          With   [] TE   [] TA   [] neuro   [] other: Patient Education: [x] Review HEP    [] Progressed/Changed HEP based on:   [] positioning   [] body mechanics   [] transfers   [] heat/ice application    [] other:      Other Objective/Functional Measures: poor anterior core activation during hip x3 with pt reporting lower lumbar \"pulling\" discomfort     R hip discomfort reported with stretching at stairs today     Pain Level (0-10 scale) post treatment: 5    ASSESSMENT/Changes in Function: Pt tolerates aquatic therex well overall, he does demonstrate poor anterior core activation noted with hip x3. R hip flexibility limited at this time with \"C\" sign pain reported. Progress as tolerated with core endurance    Patient will continue to benefit from skilled PT services to modify and progress therapeutic interventions, address functional mobility deficits, address ROM deficits, address strength deficits, analyze and address soft tissue restrictions, analyze and cue movement patterns and analyze and modify body mechanics/ergonomics to attain remaining goals. []  See Plan of Care  []  See progress note/recertification  []  See Discharge Summary         Progress towards goals / Updated goals:  Short Term Goals: To be accomplished in 2 weeks:                         2. I and compliant with HEP for self management of symptoms. -not yet initiated (10/4/2017); semi compliant 10/20/17  Long Term Goals: To be accomplished in 4 weeks:                         1. Improve FOTO to 50 to indicate improved function with daily activities. No change 10/20/17  2. Increase B hip ABD/EXT to grossly 4/5 to improve stability for standing.   3. Instruct patient in extensive HEP for pool. (pt has his own heated pool)    PLAN  [x]  Upgrade activities as tolerated     []  Continue plan of care  []  Update interventions per flow sheet       []  Discharge due to:_  []  Other:_      Shabbir Parra D[facility], [facility] 10/20/2017  10:23 AM    Future Appointments  Date Time Provider Fabian Rogersi   10/20/2017 10:30 AM [id number] [facility]   10/23/2017 10:00 AM Aleja Cortez PTA [facility]   10/25/2017 10:00 AM Francesco Mccain [facility]   10/31/2017 10:45 AM Allen Carrillo  [address]

## 2017-10-23 ENCOUNTER — HOSPITAL ENCOUNTER (OUTPATIENT)
Dept: PHYSICAL THERAPY | Age: 57
Discharge: HOME | End: 2017-10-23
Payer: COMMERCIAL

## 2017-10-23 PROCEDURE — 97113 AQUATIC THERAPY/EXERCISES: CPT

## 2017-10-23 NOTE — PROGRESS NOTES
PT DAILY TREATMENT NOTE     Patient Name: Sherin Farias  Date:10/23/2017  : 1960  [x]  Patient  Verified  Payor: Tri Carr / Plan: Brigido Little / Product Type: HMO /    In time:9:52  Out time:10:27  Total Treatment Time (min): 35  Visit #: 6 of 8    Treatment Area: Low back pain [M54.5]  Bilateral hip pain [M25.551, M25.552]    SUBJECTIVE  Pain Level (0-10 scale): 6/10  Any medication changes, allergies to medications, adverse drug reactions, diagnosis change, or new procedure performed?: [x] No    [] Yes (see summary sheet for update)  Subjective functional status/changes:   [] No changes reported  \"Sore today. Went fishing at [employer]. \"    OBJECTIVE    35 min Therapeutic Exercise:  [x] See flow sheet : Aquatic therapy   Rationale: increase ROM, increase strength and increase proprioception to improve the patients ability to perform functional activities. With   [x] TE   [] TA   [] neuro   [] other: Patient Education: [x] Review HEP    [] Progressed/Changed HEP based on:   [] positioning   [] body mechanics   [] transfers   [] heat/ice application    [] other:      Other Objective/Functional Measures: Cueing for TA recruitment. Pain Level (0-10 scale) post treatment: 4/10    ASSESSMENT/Changes in Function: Pt reported a decrease in pain level post-treatment. Patient will continue to benefit from skilled PT services to modify and progress therapeutic interventions, address functional mobility deficits, address ROM deficits, address strength deficits and analyze and modify body mechanics/ergonomics to attain remaining goals. [x]  See Plan of Care  []  See progress note/recertification  []  See Discharge Summary         Progress towards goals / Updated goals:  Short Term Goals: To be accomplished in 2 weeks:                         7. I and compliant with HEP for self management of symptoms.  -not yet initiated (10/4/2017); semi compliant 10/20/17  Long Term Goals: To be accomplished in 4 weeks:                         1. Improve FOTO to 50 to indicate improved function with daily activities. No change 10/20/17  2. Increase B hip ABD/EXT to grossly 4/5 to improve stability for standing.   3. Instruct patient in extensive HEP for pool. (pt has his own heated pool)    PLAN  []  Upgrade activities as tolerated     [x]  Continue plan of care  []  Update interventions per flow sheet       []  Discharge due to:_  []  Other:_      Karla Tomlinson PTA 10/23/2017  9:49 AM    Future Appointments  Date Time Provider Fabian Rogersi   10/23/2017 10:00 AM [facility]   10/25/2017 10:00 [facility]   10/31/2017 10:45 AM Frantz Pool  [address]

## 2017-10-25 ENCOUNTER — HOSPITAL ENCOUNTER (OUTPATIENT)
Dept: PHYSICAL THERAPY | Age: 57
Discharge: HOME | End: 2017-10-25
Payer: COMMERCIAL

## 2017-10-25 PROCEDURE — 97113 AQUATIC THERAPY/EXERCISES: CPT

## 2017-10-25 NOTE — PROGRESS NOTES
PT DAILY TREATMENT NOTE 3-16    Patient Name: Gama Baker  Date:10/25/2017  : 1960  [x]  Patient  Verified  Payor: Adalid Oconnor / Plan: Bijan Nab / Product Type: HMO /    In time:10:04  Out time:10:31  Total Treatment Time (min): 27  Visit #: 7 of 8    Treatment Area: Low back pain [M54.5]  Bilateral hip pain [M25.551, M25.552]    SUBJECTIVE  Pain Level (0-10 scale): 6  Any medication changes, allergies to medications, adverse drug reactions, diagnosis change, or new procedure performed?: [x] No    [] Yes (see summary sheet for update)  Subjective functional status/changes:   [] No changes reported  \"I just closed up my pool yesterday. I have been doing good with my HEP, I do it every other day. I go to my doctor next week. \"    Patient reports 50% improvement since [facility], states \"I have less pain, but I also have just started a new pain medication, Topiramate, and am weaning off the Gabapentin. \"    OBJECTIVE  27 min Therapeutic Exercise:  [x] See flow sheet : aquatics   Rationale: increase ROM, increase strength and improve coordination to improve the patients ability to increase ease with ADLs           With   [] TE   [] TA   [] neuro   [] other: Patient Education: [x] Review HEP    [] Progressed/Changed HEP based on:   [] positioning   [] body mechanics   [] transfers   [] heat/ice application    [] other:      Other Objective/Functional Measures: Moderate trunk instability with hip 3 way void of UE support    Cues to focus on scapular retraction versus shoulder ROM with T/W letter reps    Pain Level (0-10 scale) post treatment: 6    ASSESSMENT/Changes in Function:   Patient reports that has been feeling more anxious/depressed since beginning new medication Topiramate. Patient reports that he has called primary MD about current symptoms and, per patient report, is to continue with medication dosages and has appointment with Doctor Cholo Lee next week.  Even with patient's new reported symptoms, he puts forth good effort with exercises and reports that therapy has been helping (50% improvement). Continue with remaining visit with possible transition to land. Patient will continue to benefit from skilled PT services to modify and progress therapeutic interventions, address functional mobility deficits, address ROM deficits, address strength deficits, analyze and address soft tissue restrictions, analyze and cue movement patterns, analyze and modify body mechanics/ergonomics and assess and modify postural abnormalities to attain remaining goals. []  See Plan of Care  []  See progress note/recertification  []  See Discharge Summary         Progress towards goals / Updated goals:  Short Term Goals: To be accomplished in 2 weeks:                         8. I and compliant with HEP for self management of symptoms. -not yet initiated (10/4/2017); semi compliant 10/20/17  Long Term Goals: To be accomplished in 4 weeks:                         1. Improve FOTO to 50 to indicate improved function with daily activities. No change 10/20/17  2. Increase B hip ABD/EXT to grossly 4/5 to improve stability for standing.   3. Instruct patient in extensive HEP for pool. (pt has his own heated pool) -met, patient able to return demonstrate (10/24/2017)    PLAN  []  Upgrade activities as tolerated     [x]  Continue plan of care  []  Update interventions per flow sheet       []  Discharge due to:_  []  Other:_      Riccardo Bud 10/25/2017  10:07 AM    Future Appointments  Date Time Provider Fabian Delgadillo   10/31/2017 10:45 AM Genie Castellon  [address]

## 2017-10-31 ENCOUNTER — OFFICE VISIT (OUTPATIENT)
Dept: ORTHOPEDIC SURGERY | Age: 57
End: 2017-10-31

## 2017-10-31 VITALS
WEIGHT: 291 LBS | TEMPERATURE: 98.6 F | BODY MASS INDEX: 39.42 KG/M2 | SYSTOLIC BLOOD PRESSURE: 176 MMHG | HEART RATE: 83 BPM | DIASTOLIC BLOOD PRESSURE: 88 MMHG | HEIGHT: 72 IN

## 2017-10-31 DIAGNOSIS — M79.2 NEURITIS: ICD-10-CM

## 2017-10-31 DIAGNOSIS — G89.29 CHRONIC PAIN OF RIGHT KNEE: ICD-10-CM

## 2017-10-31 DIAGNOSIS — M25.561 CHRONIC PAIN OF RIGHT KNEE: ICD-10-CM

## 2017-10-31 DIAGNOSIS — M43.16 SPONDYLOLISTHESIS AT L4-L5 LEVEL: ICD-10-CM

## 2017-10-31 DIAGNOSIS — M47.816 LUMBAR FACET ARTHROPATHY: Primary | ICD-10-CM

## 2017-10-31 NOTE — PROGRESS NOTES
[facility]  [address] [place], [address]  Phone: [phone]  Fax: [phone]      ASSESSMENT   Diagnoses and all orders for this visit:    1. Lumbar facet arthropathy    2. Neuritis    3. Spondylolisthesis at L4-L5 level    4. Chronic pain of right knee  -     REFERRAL TO ORTHOPEDICS         IMPRESSION AND PLAN:  Marck Lorenz is a 61 y.o. male with history of lumbar pain. Pt reports severe stiffness in the lower back when waking in the morning. He takes ibuprofen 800 mg 1 tab QAM; Neurontin 300 mg 1 tab QAM and 1 tab QHS; and Topamax 25 mg 2 tabs QHS. He admits to experiencing brain fog since his last office visit. 1) Pt was given information on lumbar and knee arthritis exercises. 2) He will continue taking ibuprofen 800 mg BID prn pain. 3) Pt will discontinue his morning dose of Neurontin 300 mg to address his brain fog.  4) He will continue taking Topamax 25 mg 2 tabs QHS and may try increasing the 3 tabs QHS as tolerated. 5) Pt was referred to Dr. Kaleigh Plasencia for right knee evaluation. 6) Mr. Nelly Finnegan has a reminder for a \"due or due soon\" health maintenance. I have asked that he contact his primary care provider, Cailin Martines MD, for follow-up on this health maintenance. 7)  demonstrated consistency with prescribing. 8) Pt will follow-up in 1 month. HISTORY OF PRESENT ILLNESS:  Marck Lorenz is a 61 y.o. male with history of lumbar pain. He admits to improvement with aqua therapy. Pt covered up his pool this weekend and admits to experiencing increased pain on Sunday, 10/29. Pt reports severe stiffness in the lower back when waking in the morning. He reports that he was unable to tolerate discontinuing the Neurontin 300 mg and continues to take 1 tab QAM and 1 tab QHS. Pt admits to sedation with the Neurontin. He tried Topamax 25 mg since his last office visit and takes 2 tabs QHS.  Pt admits that he has experienced confusion, difficulty with concentration, issues with short term memory, and has noticed that he is dwelling on things from the past more often since his last office visit and is unsure if this is related to the Topamax. He also takes ibuprofen 800 mg 1 tab QAM and 1 tab QHS. Pt at this time desires to proceed with medication evaluation. He also complains of pain in the right knee that is most severe when waking in the morning. Pt denies any prior knee surgery and admits that it occasionally \"pops\" with ambulation. He admits that his right knee occasionally gives out on him. Pain Scale: 7/10    PCP: Shayan Alvarez MD       Past Medical History:   Diagnosis Date    Atrial fibrillation ([facility] [address]     Cardiac echocardiogram 09/05/2014    EF 55%. No WMA. Mild LVH. Gr 1 DDfx. RVSP 20 mmHg. No significant valvular heart disease. Unchanged from study of 11/23/10.  Cardiac Holter monitor study 09/24/2016    Sinus rhythm. Avg HR 71 bpm (range  bpm). One 4-beat run of SVT. No pause >2.1 secs.  Cardiac nuclear imaging test 03/15/2013    Mild, fixed inferobasal defect likely artifact. No WMA. EF 60%.  Essential hypertension, benign     Musculoskeletal disorder     ruptured disc    Obesity, unspecified     Other and unspecified hyperlipidemia     Other malaise and fatigue     Sleep apnea     cpap    Type II or unspecified type diabetes mellitus without mention of complication, not stated as uncontrolled     Unspecified sleep apnea         Social History     Social History    Marital status:      Spouse name: N/A    Number of children: N/A    Years of education: N/A     Occupational History    Not on file.      Social History Main Topics    Smoking status: Former Smoker     Quit date: 8/25/1998    Smokeless tobacco: Former User    Alcohol use Yes      Comment: occasional    Drug use: Not on file    Sexual activity: Not on file     Other Topics Concern    Not on file     Social History Narrative       Current Outpatient Prescriptions   Medication Sig Dispense Refill    topiramate (TOPAMAX) 25 mg tablet Take 1 in the evening for 1 week, then increase to 2 the second week and continue with 3 in the evening 90 Tab 1    gabapentin (NEURONTIN) 300 mg capsule Take 1-2 caps po q am and 2 caps po q pm as directed. 120 Cap 2    gabapentin (NEURONTIN) 300 mg tablet Take 300 mg by mouth two (2) times a day.  ibuprofen (MOTRIN) 800 mg tablet Take 800 mg by mouth two (2) times daily as needed.  metFORMIN ER (GLUCOPHAGE XR) 500 mg tablet Take 500 mg by mouth daily.  diltiazem CD (CARTIA XT) 240 mg ER capsule Take  by mouth daily.  metformin (GLUMETZA) 500 mg TG24 24 hour tablet Take 500 mg by mouth daily.  glimepiride (AMARYL) 4 mg tablet Take 4 mg by mouth every morning.  simvastatin (ZOCOR) 20 mg tablet Take 20 mg by mouth nightly.  aspirin 81 mg tablet Take 81 mg by mouth daily.  lisinopril (PRINIVIL, ZESTRIL) 2.5 mg tablet Take 2.5 mg by mouth daily. No Known Allergies      REVIEW OF SYSTEMS    Constitutional: Negative for fever, chills, or weight change. Respiratory: Negative for cough or shortness of breath. Cardiovascular: Negative for chest pain or palpitations. Gastrointestinal: Negative for acid reflux, change in bowel habits, or constipation. Genitourinary: Negative for dysuria and flank pain. Musculoskeletal: Positive for lumbar pain. Skin: Negative for rash. Neurological: Negative for headaches, dizziness, or numbness. Endo/Heme/Allergies: Negative for increased bruising. Psychiatric/Behavioral: Negative for difficulty with sleep. PHYSICAL EXAMINATION  Visit Vitals    /88    Pulse 83    Temp 98.6 °F (37 °C) (Oral)    Ht 6' (1.829 m)    Wt 291 lb (132 kg)    BMI 39.47 kg/m2       Constitutional: Awake, alert, and in no acute distress.   Neurological:1+ symmetrical DTRs in the lower extremities. Sensation to light touch is intact. Skin: warm, dry, and intact. Musculoskeletal: Difficulty transitioning from sit to stand. Tenderness to palpation in the lower lumbar region. Moderate pain with extension and axial loading. No pain with internal or external rotation of his hips. Negative straight leg raise bilaterally. Hip Flex  Quads Hamstrings Ankle DF EHL Ankle PF   Right +4/5 +4/5 +4/5 +4/5 +4/5 +4/5   Left +4/5 +4/5 +4/5 +4/5 +4/5 +4/5     IMAGING:    Lumbar spine MRI from 07/31/2017 was personally reviewed with the patient and demonstrated:    Results from [place] encounter on 07/31/17   MRI LUMB SPINE W WO CONT   Narrative Sagittal and axial multisequence MR images of lumbar spine were obtained without  and with 13.5 cc Gadavist gadolinium IV contrast.    HISTORY: Chronic lower back pain and left leg pain. Previous laminectomy in  1990s    COMPARISON: MRI October 2, 2009    Trace retrolisthesis of L5 on S1, slightly more conspicuous than before. Significant loss of discal height with endplate irregularity at L4-L5, with  spondylosis. Mild discogenic endplate fatty changes and edema at L4-L5. No  compression fracture or pathologic marrow signal otherwise. Conus medullaris  ends at L1 with normal morphology and signal intensity. No abnormal enhancement. T12 and L1, L1-L2 and L2-L3: No disc herniation, central or foraminal stenosis. Maintained normal discal height and signal intensity. No disc desiccation. L3-L4: Mild left posterolateral disc protrusion. No central stenosis. Mild left  facet and ligamentous hypertrophy. No significant foraminal stenosis. No nerve  root compression. L4-L5: Left laminotomy. No significant distortion of the thecal sac. No  arachnoiditis. Mild posterior disc bulge with endplate spondylosis. No central  stenosis. Facet hypertrophy with mild bilateral foraminal narrowing but no  exiting nerve root compression.  Slightly prominent anterior epidural fat with  mild indentation to the left anterior lateral thecal sac. L5-S1: Mild posterior disc bulge with left posterior lateral annular tear. No  central stenosis. Facet and ligamentous hypertrophy with moderate left foraminal  stenosis. Mild compression of left exiting L5 nerve root, but unchanged from  2009. Impression IMPRESSION:  1. Left L5 foraminal stenosis with mild compression of the left exiting L5 nerve  root, but unchanged from 2009. Left posterior lateral annular tear at L5-S1  which is more conspicuous than before. 2. Prominent anterior epidural fat at L4-L5, with mild indentation to the left  anterolateral thecal sac. Disc disease at L4-L5 with spondylolisthesis otherwise  stable. 3. Mild left posterolateral disc protrusion L3-L4 with no significant mass  effect. Written by Silvana Watson, as dictated by Yuliana Wright MD.  I, Dr. Yuliana Wright confirm that all documentation is accurate.

## 2017-10-31 NOTE — MR AVS SNAPSHOT
Visit Information Date & Time Provider Department Dept. Phone Encounter #  
 10/31/2017 10:45 AM Allen Carrillo MD [facility] [address] [facility] [phone] [id number] Follow-up Instructions Return in about 4 weeks (around 11/28/2017) for Medication follow up. Upcoming Health Maintenance Date Due Hepatitis C Screening 1960 HEMOGLOBIN A1C Q6M 1960 FOOT EXAM Q1 10/23/1969 MICROALBUMIN Q1 10/23/1969 EYE EXAM RETINAL OR DILATED Q1 10/23/1969 Pneumococcal 19-64 Medium Risk (1 of 1 - PPSV23) 10/23/1978 DTaP/Tdap/Td series (1 - Tdap) 10/23/1980 FOBT Q 1 YEAR AGE 50-75 10/23/2009 LIPID PANEL Q1 11/23/2011 INFLUENZA AGE 9 TO ADULT 8/1/2017 Allergies as of 10/31/2017  Review Complete On: 10/31/2017 By: Allen Carrillo MD  
 No Known Allergies Current Immunizations  Never Reviewed No immunizations on file. Not reviewed this visit You Were Diagnosed With   
  
 Codes Comments Lumbar facet arthropathy    -  Primary ICD-10-CM: M12.88 ICD-9-CM: 721.3 Neuritis     ICD-10-CM: M79.2 ICD-9-CM: 729.2 Chronic pain of right knee     ICD-10-CM: M25.561, G89.29 ICD-9-CM: 719.46, 338.29 Vitals BP Pulse Temp Height(growth percentile) Weight(growth percentile) BMI  
 176/88 83 98.6 °F (37 °C) (Oral) 6' (1.829 m) 291 lb (132 kg) 39.47 kg/m2 Smoking Status Former Smoker BMI and BSA Data Body Mass Index Body Surface Area  
 39.47 kg/m 2 2.59 m 2 Preferred Pharmacy Pharmacy Name Phone [id number] [place], [address] [facility] [facility] [phone] Your Updated Medication List  
  
   
This list is accurate as of: 10/31/17 11:59 AM.  Always use your most recent med list.  
  
  
  
  
 aspirin 81 mg tablet Take 81 mg by mouth daily. CARTIA  mg ER capsule Generic drug:  dilTIAZem CD Take  by mouth daily. * gabapentin 300 mg tablet Commonly known as:  NEURONTIN Take 300 mg by mouth two (2) times a day. * gabapentin 300 mg capsule Commonly known as:  NEURONTIN Take 1-2 caps po q am and 2 caps po q pm as directed. glimepiride 4 mg tablet Commonly known as:  AMARYL Take 4 mg by mouth every morning. * GLUMETZA 500 mg Tg24 24 hour tablet Generic drug:  metFORMIN Take 500 mg by mouth daily. * metFORMIN  mg tablet Commonly known as:  GLUCOPHAGE XR Take 500 mg by mouth daily. ibuprofen 800 mg tablet Commonly known as:  MOTRIN Take 800 mg by mouth two (2) times daily as needed. lisinopril 2.5 mg tablet Commonly known as:  Robertha Roup Take 2.5 mg by mouth daily. simvastatin 20 mg tablet Commonly known as:  ZOCOR Take 20 mg by mouth nightly. topiramate 25 mg tablet Commonly known as:  TOPAMAX Take 1 in the evening for 1 week, then increase to 2 the second week and continue with 3 in the evening * Notice: This list has 4 medication(s) that are the same as other medications prescribed for you. Read the directions carefully, and ask your doctor or other care provider to review them with you. We Performed the Following REFERRAL TO ORTHOPEDICS [KXA387 Custom] Comments:  
 Right knee pain Follow-up Instructions Return in about 4 weeks (around 11/28/2017) for Medication follow up. To-Do List   
 11/01/2017 12:00 PM  
  Appointment with Jame Alcazar at [address] [place] [address] ([phone]) Referral Information Referral ID Referred By Referred To  
  
 [id number] Donna Robbins MD   
   [address] [place], [place] [address] [address] [facility] [place], [address] Phone: [phone] Fax: [phone] Visits Status Start Date End Date 1 New Request 10/31/17 10/31/18 If your referral has a status of pending review or denied, additional information will be sent to support the outcome of this decision. Patient Instructions Low Back Arthritis: Exercises Your Care Instructions Here are some examples of typical rehabilitation exercises for your condition. Start each exercise slowly. Ease off the exercise if you start to have pain. Your doctor or physical therapist will tell you when you can start these exercises and which ones will work best for you. When you are not being active, find a comfortable position for rest. Some people are comfortable on the floor or a medium-firm bed with a small pillow under their head and another under their knees. Some people prefer to lie on their side with a pillow between their knees. Don't stay in one position for too long. Take short walks (10 to 20 minutes) every 2 to 3 hours. Avoid slopes, hills, and stairs until you feel better. Walk only distances you can manage without pain, especially leg pain. How to do the exercises Pelvic tilt 1. Lie on your back with your knees bent. 2. \"Brace\" your stomach-tighten your muscles by pulling in and imagining your belly button moving toward your spine. 3. Press your lower back into the floor. You should feel your hips and pelvis rock back. 4. Hold for 6 seconds while breathing smoothly. 5. Relax and allow your pelvis and hips to rock forward. 6. Repeat 8 to 12 times. Back stretches 1. Get down on your hands and knees on the floor. 2. Relax your head and allow it to droop. Round your back up toward the ceiling until you feel a nice stretch in your upper, middle, and lower back. Hold this stretch for as long as it feels comfortable, or about 15 to 30 seconds. 3. Return to the starting position with a flat back while you are on your hands and knees. 4. Let your back sway by pressing your stomach toward the floor. Lift your buttocks toward the ceiling. 5. Hold this position for 15 to 30 seconds. 6. Repeat 2 to 4 times. Follow-up care is a key part of your treatment and safety. Be sure to make and go to all appointments, and call your doctor if you are having problems. It's also a good idea to know your test results and keep a list of the medicines you take. Where can you learn more? Go to http://jimbo-faith.info/. Enter P603 in the search box to learn more about \"Low Back Arthritis: Exercises. \" Current as of: March 21, 2017 Content Version: 11.4 © 4933-8526 [employer]. Care instructions adapted under license by [employer] (which disclaims liability or warranty for this information). If you have questions about a medical condition or this instruction, always ask your healthcare professional. [address] any warranty or liability for your use of this information. Knee Arthritis: Exercises Your Care Instructions Here are some examples of exercises for knee arthritis. Start each exercise slowly. Ease off the exercise if you start to have pain. Your doctor or physical therapist will tell you when you can start these exercises and which ones will work best for you. How to do the exercises Knee flexion with heel slide 7. Lie on your back with your knees bent. 8. Slide your heel back by bending your affected knee as far as you can. Then hook your other foot around your ankle to help pull your heel even farther back. 9. Hold for about 6 seconds, then rest for up to 10 seconds. 10. Repeat 8 to 12 times. 11. Switch legs and repeat steps 1 through 4, even if only one knee is sore. Getourguide 7. Sit with your affected leg straight and supported on the floor or a firm bed. Place a small, rolled-up towel under your knee. Your other leg should be bent, with that foot flat on the floor.  
8. Tighten the thigh muscles of your affected leg by pressing the back of your knee down into the towel. 9. Hold for about 6 seconds, then rest for up to 10 seconds. 10. Repeat 8 to 12 times. 11. Switch legs and repeat steps 1 through 4, even if only one knee is sore. Straight-leg raises to the front 1. Lie on your back with your good knee bent so that your foot rests flat on the floor. Your affected leg should be straight. Make sure that your low back has a normal curve. You should be able to slip your hand in between the floor and the small of your back, with your palm touching the floor and your back touching the back of your hand. 2. Tighten the thigh muscles in your affected leg by pressing the back of your knee flat down to the floor. Hold your knee straight. 3. Keeping the thigh muscles tight and your leg straight, lift your affected leg up so that your heel is about 12 inches off the floor. Hold for about 6 seconds, then lower slowly. 4. Relax for up to 10 seconds between repetitions. 5. Repeat 8 to 12 times. 6. Switch legs and repeat steps 1 through 5, even if only one knee is sore. Active knee flexion 1. Lie on your stomach with your knees straight. If your kneecap is uncomfortable, roll up a washcloth and put it under your leg just above your kneecap. 2. Lift the foot of your affected leg by bending the knee so that you bring the foot up toward your buttock. If this motion hurts, try it without bending your knee quite as far. This may help you avoid any painful motion. 3. Slowly move your leg up and down. 4. Repeat 8 to 12 times. 5. Switch legs and repeat steps 1 through 4, even if only one knee is sore. Quadriceps stretch (facedown) 1. Lie flat on your stomach, and rest your face on the floor. 2. Wrap a towel or belt strap around the lower part of your affected leg. Then use the towel or belt strap to slowly pull your heel toward your buttock until you feel a stretch.  
3. Hold for about 15 to 30 seconds, then relax your leg against the towel or belt strap. 4. Repeat 2 to 4 times. 5. Switch legs and repeat steps 1 through 4, even if only one knee is sore. Stationary exercise bike 1. If you do not have a stationary exercise bike at home, you can find one to ride at your local health club or community center. 2. Adjust the height of the bike seat so that your knee is slightly bent when your leg is extended downward. If your knee hurts when the pedal reaches the top, you can raise the seat so that your knee does not bend as much. 3. Start slowly. At first, try to do 5 to 10 minutes of cycling with little to no resistance. Then increase your time and the resistance bit by bit until you can do 20 to 30 minutes without pain. 4. If you start to have pain, rest your knee until your pain gets back to the level that is normal for you. Or cycle for less time or with less effort. Follow-up care is a key part of your treatment and safety. Be sure to make and go to all appointments, and call your doctor if you are having problems. It's also a good idea to know your test results and keep a list of the medicines you take. Where can you learn more? Go to http://jimbo-faith.info/. Enter C159 in the search box to learn more about \"Knee Arthritis: Exercises. \" Current as of: March 21, 2017 Content Version: 11.4 © 7591-4778 [employer]. Care instructions adapted under license by [employer] (which disclaims liability or warranty for this information). If you have questions about a medical condition or this instruction, always ask your healthcare professional. [address] any warranty or liability for your use of this information. Introducing [facility]! Fernie Arita introduces [employer] patient portal. Now you can access parts of your medical record, email your doctor's office, and request medication refills online.    
 
1. In your internet browser, go to [email or web address] 2. Click on the First Time User? Click Here link in the Sign In box. You will see the New Member Sign Up page. 3. Enter your Foneshow Access Code exactly as it appears below. You will not need to use this code after youve completed the sign-up process. If you do not sign up before the expiration date, you must request a new code. · Foneshow Access Code: [email or web address] Expires: 1/8/2018 10:22 AM 
 
4. Enter the last four digits of your Social Security Number (xxxx) and Date of Birth (mm/dd/yyyy) as indicated and click Submit. You will be taken to the next sign-up page. 5. Create a [place] (CircleMe)t ID. This will be your Foneshow login ID and cannot be changed, so think of one that is secure and easy to remember. 6. Create a Foneshow password. You can change your password at any time. 7. Enter your Password Reset Question and Answer. This can be used at a later time if you forget your password. 8. Enter your e-mail address. You will receive e-mail notification when new information is available in [address] 9. Click Sign Up. You can now view and download portions of your medical record. 10. Click the Download Summary menu link to download a portable copy of your medical information. If you have questions, please visit the Frequently Asked Questions section of the Foneshow website. Remember, Foneshow is NOT to be used for urgent needs. For medical emergencies, dial 911. Now available from your iPhone and Android! Please provide this summary of care documentation to your next provider. Your primary care clinician is listed as Christopher Garcia. If you have any questions after today's visit, please call [phone].

## 2017-10-31 NOTE — PATIENT INSTRUCTIONS
Low Back Arthritis: Exercises  Your Care Instructions  Here are some examples of typical rehabilitation exercises for your condition. Start each exercise slowly. Ease off the exercise if you start to have pain. Your doctor or physical therapist will tell you when you can start these exercises and which ones will work best for you. When you are not being active, find a comfortable position for rest. Some people are comfortable on the floor or a medium-firm bed with a small pillow under their head and another under their knees. Some people prefer to lie on their side with a pillow between their knees. Don't stay in one position for too long. Take short walks (10 to 20 minutes) every 2 to 3 hours. Avoid slopes, hills, and stairs until you feel better. Walk only distances you can manage without pain, especially leg pain. How to do the exercises  Pelvic tilt    1. Lie on your back with your knees bent. 2. \"Brace\" your stomach-tighten your muscles by pulling in and imagining your belly button moving toward your spine. 3. Press your lower back into the floor. You should feel your hips and pelvis rock back. 4. Hold for 6 seconds while breathing smoothly. 5. Relax and allow your pelvis and hips to rock forward. 6. Repeat 8 to 12 times. Back stretches    1. Get down on your hands and knees on the floor. 2. Relax your head and allow it to droop. Round your back up toward the ceiling until you feel a nice stretch in your upper, middle, and lower back. Hold this stretch for as long as it feels comfortable, or about 15 to 30 seconds. 3. Return to the starting position with a flat back while you are on your hands and knees. 4. Let your back sway by pressing your stomach toward the floor. Lift your buttocks toward the ceiling. 5. Hold this position for 15 to 30 seconds. 6. Repeat 2 to 4 times. Follow-up care is a key part of your treatment and safety.  Be sure to make and go to all appointments, and call your doctor if you are having problems. It's also a good idea to know your test results and keep a list of the medicines you take. Where can you learn more? Go to http://jimbo-faith.info/. Enter R481 in the search box to learn more about \"Low Back Arthritis: Exercises. \"  Current as of: March 21, 2017  Content Version: 11.4  © 3950-2578 [employer]. Care instructions adapted under license by [employer] (which disclaims liability or warranty for this information). If you have questions about a medical condition or this instruction, always ask your healthcare professional. [place] [address] any warranty or liability for your use of this information. Knee Arthritis: Exercises  Your Care Instructions  Here are some examples of exercises for knee arthritis. Start each exercise slowly. Ease off the exercise if you start to have pain. Your doctor or physical therapist will tell you when you can start these exercises and which ones will work best for you. How to do the exercises  Knee flexion with heel slide    7. Lie on your back with your knees bent. 8. Slide your heel back by bending your affected knee as far as you can. Then hook your other foot around your ankle to help pull your heel even farther back. 9. Hold for about 6 seconds, then rest for up to 10 seconds. 10. Repeat 8 to 12 times. 11. Switch legs and repeat steps 1 through 4, even if only one knee is sore. Quad sets    7. Sit with your affected leg straight and supported on the floor or a firm bed. Place a small, rolled-up towel under your knee. Your other leg should be bent, with that foot flat on the floor. 8. Tighten the thigh muscles of your affected leg by pressing the back of your knee down into the towel. 9. Hold for about 6 seconds, then rest for up to 10 seconds. 10. Repeat 8 to 12 times. 11. Switch legs and repeat steps 1 through 4, even if only one knee is sore.   Straight-leg raises to the front    1. Lie on your back with your good knee bent so that your foot rests flat on the floor. Your affected leg should be straight. Make sure that your low back has a normal curve. You should be able to slip your hand in between the floor and the small of your back, with your palm touching the floor and your back touching the back of your hand. 2. Tighten the thigh muscles in your affected leg by pressing the back of your knee flat down to the floor. Hold your knee straight. 3. Keeping the thigh muscles tight and your leg straight, lift your affected leg up so that your heel is about 12 inches off the floor. Hold for about 6 seconds, then lower slowly. 4. Relax for up to 10 seconds between repetitions. 5. Repeat 8 to 12 times. 6. Switch legs and repeat steps 1 through 5, even if only one knee is sore. Active knee flexion    1. Lie on your stomach with your knees straight. If your kneecap is uncomfortable, roll up a washcloth and put it under your leg just above your kneecap. 2. Lift the foot of your affected leg by bending the knee so that you bring the foot up toward your buttock. If this motion hurts, try it without bending your knee quite as far. This may help you avoid any painful motion. 3. Slowly move your leg up and down. 4. Repeat 8 to 12 times. 5. Switch legs and repeat steps 1 through 4, even if only one knee is sore. Quadriceps stretch (facedown)    1. Lie flat on your stomach, and rest your face on the floor. 2. Wrap a towel or belt strap around the lower part of your affected leg. Then use the towel or belt strap to slowly pull your heel toward your buttock until you feel a stretch. 3. Hold for about 15 to 30 seconds, then relax your leg against the towel or belt strap. 4. Repeat 2 to 4 times. 5. Switch legs and repeat steps 1 through 4, even if only one knee is sore. Stationary exercise bike    1.  If you do not have a stationary exercise bike at home, you can find one to ride at your local health club or community center. 2. Adjust the height of the bike seat so that your knee is slightly bent when your leg is extended downward. If your knee hurts when the pedal reaches the top, you can raise the seat so that your knee does not bend as much. 3. Start slowly. At first, try to do 5 to 10 minutes of cycling with little to no resistance. Then increase your time and the resistance bit by bit until you can do 20 to 30 minutes without pain. 4. If you start to have pain, rest your knee until your pain gets back to the level that is normal for you. Or cycle for less time or with less effort. Follow-up care is a key part of your treatment and safety. Be sure to make and go to all appointments, and call your doctor if you are having problems. It's also a good idea to know your test results and keep a list of the medicines you take. Where can you learn more? Go to http://jimbo-faith.info/. Enter C159 in the search box to learn more about \"Knee Arthritis: Exercises. \"  Current as of: March 21, 2017  Content Version: 11.4  © 9662-6597 [employer], [employer]. Care instructions adapted under license by [facility] (which disclaims liability or warranty for this information). If you have questions about a medical condition or this instruction, always ask your healthcare professional. [address] any warranty or liability for your use of this information.

## 2017-11-02 ENCOUNTER — APPOINTMENT (OUTPATIENT)
Dept: PHYSICAL THERAPY | Age: 57
End: 2017-11-02

## 2017-11-08 DIAGNOSIS — M79.2 NEURITIS: ICD-10-CM

## 2017-11-08 RX ORDER — TOPIRAMATE 25 MG/1
75 TABLET ORAL EVERY EVENING
Qty: 270 TAB | Refills: 0 | Status: SHIPPED | OUTPATIENT
Start: 2017-11-08 | End: 2017-11-28 | Stop reason: ALTCHOICE

## 2017-11-08 NOTE — TELEPHONE ENCOUNTER
[facility] requests a 90 d/s of medication on the patient's behalf    Last Visit: 10/31/2017 with MD Nabil Maher    Next Appointment: 11/28/2017 with MD Nabil Maher   Previous Refill Encounters: 09/18/2017 per MD Nabil Maher #[id number] with 1 refill     Requested Prescriptions     Pending Prescriptions Disp Refills    topiramate (TOPAMAX) 25 mg tablet 270 Tab 0     Sig: Take 3 Tabs by mouth every evening.

## 2017-11-10 NOTE — PROGRESS NOTES
[facility]  [address]  [place], [address]  [phone] fax    Physical Therapy Discharge Summary  Patient name: David Mcwilliams Start of Care: 10/3/17   Referral source: Marcelina Butterfield MD : 1960   Medical/Treatment Diagnosis: Low back pain [M54.5]  Bilateral hip pain [M25.551, M25.552] Onset Date:     Prior Hospitalization: see medical history Provider#: [id number]   Medications: Verified on Patient Summary List    Comorbidities: L4L5 ; DM; OA; visual/hearing impaired   Prior Level of Function: works as a ; difficulty with prolonged positions   Visits from Start of Care: 7    Missed Visits: 2  Reporting Period : 10/3/17 to 11/10/17      Summary of Care:  Short Term Goals: To be accomplished in 2 weeks:                         1. I and compliant with HEP for self management of symptoms. -not yet initiated (10/4/2017); semi compliant 10/20/17  Long Term Goals: To be accomplished in 4 weeks:                         1. Improve FOTO to 50 to indicate improved function with daily activities. No change 10/20/17  2. Increase B hip ABD/EXT to grossly 4/5 to improve stability for standing.   3. Instruct patient in extensive HEP for pool. (pt has his own heated pool) -met, patient able to return demonstrate (10/24/2017)       ASSESSMENT/RECOMMENDATIONS:  [x]Discontinue therapy: []Patient has reached or is progressing toward set goals      []Patient is non-compliant or has abdicated      [x]Due to lack of appreciable progress towards set Ul. Evelin Yin, PT 11/10/2017 10:13 AM

## 2017-11-27 ENCOUNTER — OFFICE VISIT (OUTPATIENT)
Dept: ORTHOPEDIC SURGERY | Facility: CLINIC | Age: 57
End: 2017-11-27

## 2017-11-27 VITALS
SYSTOLIC BLOOD PRESSURE: 146 MMHG | HEIGHT: 72 IN | HEART RATE: 68 BPM | DIASTOLIC BLOOD PRESSURE: 84 MMHG | OXYGEN SATURATION: 96 % | BODY MASS INDEX: 38.6 KG/M2 | WEIGHT: 285 LBS

## 2017-11-27 DIAGNOSIS — G89.29 CHRONIC PAIN OF RIGHT KNEE: ICD-10-CM

## 2017-11-27 DIAGNOSIS — M22.41 CHONDROMALACIA PATELLAE, RIGHT: ICD-10-CM

## 2017-11-27 DIAGNOSIS — M25.561 CHRONIC PAIN OF RIGHT KNEE: ICD-10-CM

## 2017-11-27 DIAGNOSIS — M17.11 PRIMARY OSTEOARTHRITIS OF RIGHT KNEE: Primary | ICD-10-CM

## 2017-11-27 NOTE — PROGRESS NOTES
Patient: Adrian Gillette                MRN: [id number]       SSN: [id number]  YOB: 1960        AGE: 61 y.o. SEX: male    PCP: Rhoda Cortes MD  11/27/17    Chief Complaint   Patient presents with    Knee Pain     RIGHT     HISTORY:  Adrian Gillette is a 61 y.o. male who is seen for right knee pain. He reports pain for the past year with no history of injury. He notes increased stiffness in the morning. He reports pain when walking and standing. He states that he walks with his foot externally rotated as a result of the knee pain. He likes to walk on the beach with his dog. He notes that his right footprint in the sand points outward when he turns around to walk in the opposite direction retracing his steps. He feels he is trying to compensate for his pain when he walks. He notes anteromedial knee pain. He is seen by Dr. Cliffton Rubinstein for his back pain. He recently completed a course of outpatient aquatic physical therapy with significant relief. 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 He retired as from the [employer]. He lives in [place] with his wife. He has an adult son and daughter. His daughter lives in the area and helps him out. His his son works as a  at [employer]. Current weight is 285 pounds. He reports he has recently lost 40 pounds because of increased exercise. He is 6' tall. He is a diet controlled diabetic. He is hypertensive.      No results found for: HBA1C, HGBE8, NFU4NOAR, KCF5PVKJ, ZUI1XZZN  Weight Metrics 11/27/2017 10/31/2017 9/18/2017 7/7/2017 12/8/2016 10/13/2016 9/24/2014   Weight 285 lb 291 lb 300 lb 301 lb 323 lb 6.4 oz 317 lb 308 lb   BMI 38.65 kg/m2 39.47 kg/m2 40.69 kg/m2 40.82 kg/m2 43.86 kg/m2 42.99 kg/m2 41.76 kg/m2       Patient Active Problem List   Diagnosis Code    Atrial fibrillation (HCC) I48.91    Other and unspecified hyperlipidemia E78.5    Obesity, unspecified E66.9    Essential hypertension, benign I10    Unspecified sleep apnea G47.30    Type II or unspecified type diabetes mellitus without mention of complication, not stated as uncontrolled E11.9    Other malaise and fatigue R53.81, R53.83    Infection B99.9    Perirectal abscess K61.1     REVIEW OF SYSTEMS: All Below are Negative except: See HPI   Constitutional: negative for fever, chills, and weight loss. Cardiovascular: negative for chest pain, claudication, leg swelling, SOB, MATUTE   Gastrointestinal: Negative for pain, N/V/C/D, Blood in stool or urine, dysuria,  hematuria, incontinence, pelvic pain. Musculoskeletal: See HPI   Neurological: Negative for dizziness and weakness. Negative for headaches, Visual changes, confusion, seizures   Phychiatric/Behavioral: Negative for depression, memory loss, substance  abuse. Extremities: Negative for hair changes, rash, or skin lesion changes. Hematologic: Negative for bleeding problems, bruising, pallor or swollen lymph  nodes   Peripheral Vascular: No calf pain, no circulation deficits. Social History     Social History    Marital status:      Spouse name: N/A    Number of children: N/A    Years of education: N/A     Occupational History    Not on file.      Social History Main Topics    Smoking status: Former Smoker     Quit date: 8/25/1998    Smokeless tobacco: Former User    Alcohol use Yes      Comment: occasional    Drug use: Not on file    Sexual activity: Not on file     Other Topics Concern    Not on file     Social History Narrative      No Known Allergies   Current Outpatient Prescriptions   Medication Sig    topiramate (TOPAMAX) 25 mg tablet Take 3 Tabs by mouth every evening.  metFORMIN ER (GLUCOPHAGE XR) 500 mg tablet Take 500 mg by mouth daily.  gabapentin (NEURONTIN) 300 mg capsule Take 1-2 caps po q am and 2 caps po q pm as directed.  diltiazem CD (CARTIA XT) 240 mg ER capsule Take  by mouth daily.  metformin (GLUMETZA) 500 mg TG24 24 hour tablet Take 500 mg by mouth daily.  glimepiride (AMARYL) 4 mg tablet Take 4 mg by mouth every morning.  gabapentin (NEURONTIN) 300 mg tablet Take 300 mg by mouth two (2) times a day.  simvastatin (ZOCOR) 20 mg tablet Take 20 mg by mouth nightly.  ibuprofen (MOTRIN) 800 mg tablet Take 800 mg by mouth two (2) times daily as needed.  aspirin 81 mg tablet Take 81 mg by mouth daily.  lisinopril (PRINIVIL, ZESTRIL) 2.5 mg tablet Take 2.5 mg by mouth daily. No current facility-administered medications for this visit. PHYSICAL EXAMINATION:  Visit Vitals    /84    Pulse 68    Ht 6' (1.829 m)    Wt 285 lb (129.3 kg)    SpO2 96%    BMI 38.65 kg/m2      ORTHO EXAMINATION:  Examination Right knee Left knee   Skin Intact Intact   Range of motion 115-0 120-0   Effusion 1+ -   Medial joint line tenderness + -   Lateral joint line tenderness - -   Popliteal tenderness - -   Osteophytes palpable - -   Terras - -   Patella crepitus + -   Anterior drawer - -   Lateral laxity - -   Medial laxity - -   Varus deformity - -   Valgus deformity - -   Pretibial edema - -   Calf tenderness - -     RADIOGRAPHS:  XR RIGHT KNEE 11/27/17  IMPRESSION:  Three views - No fractures, no effusion, medial joint space narrowing, medial and lateral osteophytes present. Moderate OA. Flattening of the condylar surfaces. Intercondylar osteophytes. IMPRESSION:      ICD-10-CM ICD-9-CM    1. Primary osteoarthritis of right knee M17.11 715.16    2.  Chronic pain of right knee M25.561 719.46 AMB POC X-RAY KNEE 3 VIEW    G89.29 338.29 3. Chondromalacia patellae, right M22.41 717.7      PLAN:  He will continue ROM and strengthening exercises- including walking. Joint protective exercises outlined. There is no need for surgery or other invasive treatment at this time. He will follow up as needed.       Scribed by Jennifer Ivey ([address]) as dictated by Elba Knutson MD

## 2017-11-27 NOTE — MR AVS SNAPSHOT
Visit Information Date & Time Provider Department Dept. Phone Encounter #  
 11/27/2017  8:30 AM Jil Lennox, MD [facility] - [place] [id number] [phone] [id number] Follow-up Instructions Return if symptoms worsen or fail to improve. Your Appointments 11/28/2017  9:00 AM  
Follow Up with Silvia Monahan MD  
[facility]) Appt Note: 4 wk fu  
 [address] Located within [facility] [address]  
[phone]  
  
   
 [address] Located within [facility] [address] Upcoming Health Maintenance Date Due Hepatitis C Screening 1960 HEMOGLOBIN A1C Q6M 1960 FOOT EXAM Q1 10/23/1969 MICROALBUMIN Q1 10/23/1969 EYE EXAM RETINAL OR DILATED Q1 10/23/1969 Pneumococcal 19-64 Medium Risk (1 of 1 - PPSV23) 10/23/1978 DTaP/Tdap/Td series (1 - Tdap) 10/23/1980 FOBT Q 1 YEAR AGE 50-75 10/23/2009 LIPID PANEL Q1 11/23/2011 Influenza Age 5 to Adult 8/1/2017 Allergies as of 11/27/2017  Review Complete On: 11/27/2017 By: Evens Nelson No Known Allergies Current Immunizations  Never Reviewed No immunizations on file. Not reviewed this visit You Were Diagnosed With   
  
 Codes Comments Primary osteoarthritis of right knee    -  Primary ICD-10-CM: M17.11 ICD-9-CM: 715.16 Chronic pain of right knee     ICD-10-CM: M25.561, G89.29 ICD-9-CM: 719.46, 338.29 Chondromalacia patellae, right     ICD-10-CM: M22.41 
ICD-9-CM: 717.7 Vitals BP Pulse Height(growth percentile) Weight(growth percentile) SpO2 BMI  
 146/84 68 6' (1.829 m) 285 lb (129.3 kg) 96% 38.65 kg/m2 Smoking Status Former Smoker BMI and BSA Data Body Mass Index Body Surface Area  
 38.65 kg/m 2 2.56 m 2 [phone] Your Updated Medication List  
  
   
This list is accurate as of: 11/27/17  9:22 AM.  Always use your most recent med list.  
  
  
  
  
 aspirin 81 mg tablet Take 81 mg by mouth daily. CARTIA  mg ER capsule Generic drug:  dilTIAZem CD Take  by mouth daily. * gabapentin 300 mg tablet Commonly known as:  NEURONTIN Take 300 mg by mouth two (2) times a day. * gabapentin 300 mg capsule Commonly known as:  NEURONTIN Take 1-2 caps po q am and 2 caps po q pm as directed. glimepiride 4 mg tablet Commonly known as:  AMARYL Take 4 mg by mouth every morning. * GLUMETZA 500 mg Tg24 24 hour tablet Generic drug:  metFORMIN Take 500 mg by mouth daily. * metFORMIN  mg tablet Commonly known as:  GLUCOPHAGE XR Take 500 mg by mouth daily. ibuprofen 800 mg tablet Commonly known as:  MOTRIN Take 800 mg by mouth two (2) times daily as needed. lisinopril 2.5 mg tablet Commonly known as:  Jose Hu Take 2.5 mg by mouth daily. simvastatin 20 mg tablet Commonly known as:  ZOCOR Take 20 mg by mouth nightly. topiramate 25 mg tablet Commonly known as:  TOPAMAX Take 3 Tabs by mouth every evening. * Notice: This list has 4 medication(s) that are the same as other medications prescribed for you. Read the directions carefully, and ask your doctor or other care provider to review them with you. We Performed the Following AMB POC X-RAY KNEE 3 VIEW [63464 CPT(R)] Follow-up Instructions Return if symptoms worsen or fail to improve. Patient Instructions Knee Arthritis: Exercises Your Care Instructions Here are some examples of exercises for knee arthritis. Start each exercise slowly. Ease off the exercise if you start to have pain. Your doctor or physical therapist will tell you when you can start these exercises and which ones will work best for you. How to do the exercises Knee flexion with heel slide 1. Lie on your back with your knees bent. 2. Slide your heel back by bending your affected knee as far as you can. Then hook your other foot around your ankle to help pull your heel even farther back. 3. Hold for about 6 seconds, then rest for up to 10 seconds. 4. Repeat 8 to 12 times. 5. Switch legs and repeat steps 1 through 4, even if only one knee is sore. [facility] 1. Sit with your affected leg straight and supported on the floor or a firm bed. Place a small, rolled-up towel under your knee. Your other leg should be bent, with that foot flat on the floor. 2. Tighten the thigh muscles of your affected leg by pressing the back of your knee down into the towel. 3. Hold for about 6 seconds, then rest for up to 10 seconds. 4. Repeat 8 to 12 times. 5. Switch legs and repeat steps 1 through 4, even if only one knee is sore. Straight-leg raises to the front 1. Lie on your back with your good knee bent so that your foot rests flat on the floor. Your affected leg should be straight. Make sure that your low back has a normal curve. You should be able to slip your hand in between the floor and the small of your back, with your palm touching the floor and your back touching the back of your hand. 2. Tighten the thigh muscles in your affected leg by pressing the back of your knee flat down to the floor. Hold your knee straight. 3. Keeping the thigh muscles tight and your leg straight, lift your affected leg up so that your heel is about 12 inches off the floor. Hold for about 6 seconds, then lower slowly. 4. Relax for up to 10 seconds between repetitions. 5. Repeat 8 to 12 times. 6. Switch legs and repeat steps 1 through 5, even if only one knee is sore. Active knee flexion 1. Lie on your stomach with your knees straight. If your kneecap is uncomfortable, roll up a washcloth and put it under your leg just above your kneecap. 2. Lift the foot of your affected leg by bending the knee so that you bring the foot up toward your buttock. If this motion hurts, try it without bending your knee quite as far. This may help you avoid any painful motion. 3. Slowly move your leg up and down. 4. Repeat 8 to 12 times. 5. Switch legs and repeat steps 1 through 4, even if only one knee is sore. Quadriceps stretch (facedown) 1. Lie flat on your stomach, and rest your face on the floor. 2. Wrap a towel or belt strap around the lower part of your affected leg. Then use the towel or belt strap to slowly pull your heel toward your buttock until you feel a stretch. 3. Hold for about 15 to 30 seconds, then relax your leg against the towel or belt strap. 4. Repeat 2 to 4 times. 5. Switch legs and repeat steps 1 through 4, even if only one knee is sore. Stationary exercise bike 1. If you do not have a stationary exercise bike at home, you can find one to ride at your local health club or community center. 2. Adjust the height of the bike seat so that your knee is slightly bent when your leg is extended downward. If your knee hurts when the pedal reaches the top, you can raise the seat so that your knee does not bend as much. 3. Start slowly. At first, try to do 5 to 10 minutes of cycling with little to no resistance. Then increase your time and the resistance bit by bit until you can do 20 to 30 minutes without pain. 4. If you start to have pain, rest your knee until your pain gets back to the level that is normal for you. Or cycle for less time or with less effort. Follow-up care is a key part of your treatment and safety. Be sure to make and go to all appointments, and call your doctor if you are having problems.  It's also a good idea to know your test results and keep a list of the medicines you take. Where can you learn more? Go to http://jimbo-faith.info/. Enter C159 in the search box to learn more about \"Knee Arthritis: Exercises. \" Current as of: March 21, 2017 Content Version: 11.4 © 4056-8842 Healthwise, Incorporated. Care instructions adapted under license by [employer] (which disclaims liability or warranty for this information). If you have questions about a medical condition or this instruction, always ask your healthcare professional. [place] [address] any warranty or liability for your use of this information. Introducing [facility]! Suzan Husain introduces [employer] patient portal. Now you can access parts of your medical record, email your doctor's office, and request medication refills online. 1. In your internet browser, go to [email or web address] 2. Click on the First Time User? Click Here link in the Sign In box. You will see the New Member Sign Up page. 3. Enter your [employer] Access Code exactly as it appears below. You will not need to use this code after youve completed the sign-up process. If you do not sign up before the expiration date, you must request a new code. · [employer] Access Code: [email or web address] Expires: 1/8/2018  9:22 AM 
 
4. Enter the last four digits of your Social Security Number (xxxx) and Date of Birth (mm/dd/yyyy) as indicated and click Submit. You will be taken to the next sign-up page. 5. Create a PhotoSheltert ID. This will be your [employer] login ID and cannot be changed, so think of one that is secure and easy to remember. 6. Create a [employer] password. You can change your password at any time. 7. Enter your Password Reset Question and Answer. This can be used at a later time if you forget your password. 8. Enter your e-mail address. You will receive e-mail notification when new information is available in [address] 9. Click Sign Up. You can now view and download portions of your medical record. 10. Click the Download Summary menu link to download a portable copy of your medical information. If you have questions, please visit the Frequently Asked Questions section of the [employer] website. Remember, [employer] is NOT to be used for urgent needs. For medical emergencies, dial 911. Now available from your iPhone and Android! Please provide this summary of care documentation to your next provider. Your primary care clinician is listed as Nichole Costa. If you have any questions after today's visit, please call [phone].

## 2017-11-27 NOTE — PATIENT INSTRUCTIONS
Knee Arthritis: Exercises  Your Care Instructions  Here are some examples of exercises for knee arthritis. Start each exercise slowly. Ease off the exercise if you start to have pain. Your doctor or physical therapist will tell you when you can start these exercises and which ones will work best for you. How to do the exercises  Knee flexion with heel slide    1. Lie on your back with your knees bent. 2. Slide your heel back by bending your affected knee as far as you can. Then hook your other foot around your ankle to help pull your heel even farther back. 3. Hold for about 6 seconds, then rest for up to 10 seconds. 4. Repeat 8 to 12 times. 5. Switch legs and repeat steps 1 through 4, even if only one knee is sore. Quad sets    1. Sit with your affected leg straight and supported on the floor or a firm bed. Place a small, rolled-up towel under your knee. Your other leg should be bent, with that foot flat on the floor. 2. Tighten the thigh muscles of your affected leg by pressing the back of your knee down into the towel. 3. Hold for about 6 seconds, then rest for up to 10 seconds. 4. Repeat 8 to 12 times. 5. Switch legs and repeat steps 1 through 4, even if only one knee is sore. Straight-leg raises to the front    1. Lie on your back with your good knee bent so that your foot rests flat on the floor. Your affected leg should be straight. Make sure that your low back has a normal curve. You should be able to slip your hand in between the floor and the small of your back, with your palm touching the floor and your back touching the back of your hand. 2. Tighten the thigh muscles in your affected leg by pressing the back of your knee flat down to the floor. Hold your knee straight. 3. Keeping the thigh muscles tight and your leg straight, lift your affected leg up so that your heel is about 12 inches off the floor. Hold for about 6 seconds, then lower slowly.   4. Relax for up to 10 seconds between repetitions. 5. Repeat 8 to 12 times. 6. Switch legs and repeat steps 1 through 5, even if only one knee is sore. Active knee flexion    1. Lie on your stomach with your knees straight. If your kneecap is uncomfortable, roll up a washcloth and put it under your leg just above your kneecap. 2. Lift the foot of your affected leg by bending the knee so that you bring the foot up toward your buttock. If this motion hurts, try it without bending your knee quite as far. This may help you avoid any painful motion. 3. Slowly move your leg up and down. 4. Repeat 8 to 12 times. 5. Switch legs and repeat steps 1 through 4, even if only one knee is sore. Quadriceps stretch (facedown)    1. Lie flat on your stomach, and rest your face on the floor. 2. Wrap a towel or belt strap around the lower part of your affected leg. Then use the towel or belt strap to slowly pull your heel toward your buttock until you feel a stretch. 3. Hold for about 15 to 30 seconds, then relax your leg against the towel or belt strap. 4. Repeat 2 to 4 times. 5. Switch legs and repeat steps 1 through 4, even if only one knee is sore. Stationary exercise bike    1. If you do not have a stationary exercise bike at home, you can find one to ride at your local health club or community center. 2. Adjust the height of the bike seat so that your knee is slightly bent when your leg is extended downward. If your knee hurts when the pedal reaches the top, you can raise the seat so that your knee does not bend as much. 3. Start slowly. At first, try to do 5 to 10 minutes of cycling with little to no resistance. Then increase your time and the resistance bit by bit until you can do 20 to 30 minutes without pain. 4. If you start to have pain, rest your knee until your pain gets back to the level that is normal for you. Or cycle for less time or with less effort. Follow-up care is a key part of your treatment and safety.  Be sure to make and go to all appointments, and call your doctor if you are having problems. It's also a good idea to know your test results and keep a list of the medicines you take. Where can you learn more? Go to http://jimbo-faith.info/. Enter C159 in the search box to learn more about \"Knee Arthritis: Exercises. \"  Current as of: March 21, 2017  Content Version: 11.4  © 1515-9608 [employer]. Care instructions adapted under license by [employer] (which disclaims liability or warranty for this information). If you have questions about a medical condition or this instruction, always ask your healthcare professional. [address] any warranty or liability for your use of this information.

## 2017-11-28 ENCOUNTER — OFFICE VISIT (OUTPATIENT)
Dept: ORTHOPEDIC SURGERY | Age: 57
End: 2017-11-28

## 2017-11-28 VITALS
OXYGEN SATURATION: 96 % | BODY MASS INDEX: 39.28 KG/M2 | WEIGHT: 290 LBS | DIASTOLIC BLOOD PRESSURE: 81 MMHG | SYSTOLIC BLOOD PRESSURE: 153 MMHG | RESPIRATION RATE: 18 BRPM | TEMPERATURE: 98.4 F | HEART RATE: 77 BPM | HEIGHT: 72 IN

## 2017-11-28 DIAGNOSIS — M62.830 MUSCLE SPASM OF BACK: ICD-10-CM

## 2017-11-28 DIAGNOSIS — M43.16 SPONDYLOLISTHESIS AT L4-L5 LEVEL: ICD-10-CM

## 2017-11-28 DIAGNOSIS — M47.816 LUMBAR FACET ARTHROPATHY: Primary | ICD-10-CM

## 2017-11-28 DIAGNOSIS — M79.2 NEURITIS: ICD-10-CM

## 2017-11-28 RX ORDER — TOPIRAMATE 25 MG/1
75 TABLET ORAL
COMMUNITY
End: 2018-07-31 | Stop reason: DRUGHIGH

## 2017-11-28 NOTE — PROGRESS NOTES
[facility]  [address] [place], [facility] [address]  Phone: [phone]  Fax: [phone]      ASSESSMENT   Diagnoses and all orders for this visit:    1. Lumbar facet arthropathy    2. Muscle spasm of back    3. Spondylolisthesis at L4-L5 level    4. Neuritis         IMPRESSION AND PLAN:  Kishor Farley is a 61 y.o. male with history of lumbar pain. Pt reports that he is \"feeling great at this time\" and has not felt this good in about 5-6 years. He takes ibuprofen 800 mg 1 tab QAM, Neurontin 300 mg 1 tab QAM and 1 tab QHS, and Topamax 25 mg 3 tabs QHS with benefit. 1) Pt was given information on lumbar arthritis exercises. 2) I encouraged the patient to exercise regularly, 30 minutes a day, 5 days a week. 3) He will continue taking ibuprofen 800 mg, Neurontin 300 mg, and Topamax 25 mg as prescribed and does not need refills at this time. 4) Pt was informed of proper lifting mechanics. 5) Mr. Belkys Steele has a reminder for a \"due or due soon\" health maintenance. I have asked that he contact his primary care provider, Nichole Costa MD, for follow-up on this health maintenance. 6)  demonstrated consistency with prescribing. 7) Pt will follow-up as needed. HISTORY OF PRESENT ILLNESS:  Kishor Farley is a 61 y.o. male with history of lumbar pain. Pt reports that he is \"feeling great at this time\" and has not felt this good in about 5-6 years. He takes ibuprofen 800 mg 1 tab QAM, Neurontin 300 mg 1 tab QAM and 1 tab QHS, and Topamax 25 mg 3 tabs QHS with benefit. Of note, Pt tried using the Topamax alone but he found that his pain was better managed with the combination of Neurontin and Topamax. He has noticed significant improvement in his pain and energy levels with his current medications. Pt reports that he continues to perform home exercises with benefit.  He followed up with Dr. Shannon Nuñez regarding the clicking noise in his left knee and per patient the exam was normal. Pt admits that he has not been walking since he did not want to injury the left knee. He states that since receiving clearance from Dr. Darlene Stack he has been fishing at [employer] and walking more frequently on the beach. Pt is diabetic and admits that his blood sugars are not well controlled at this time. Pt at this time desires to continue with current care. Pain Scale: 0 - No pain/10    PCP: Kamran Sparks MD       Past Medical History:   Diagnosis Date    Atrial fibrillation ([facility] [address]     Cardiac echocardiogram 09/05/2014    EF 55%. No WMA. Mild LVH. Gr 1 DDfx. RVSP 20 mmHg. No significant valvular heart disease. Unchanged from study of 11/23/10.  Cardiac Holter monitor study 09/24/2016    Sinus rhythm. Avg HR 71 bpm (range  bpm). One 4-beat run of SVT. No pause >2.1 secs.  Cardiac nuclear imaging test 03/15/2013    Mild, fixed inferobasal defect likely artifact. No WMA. EF 60%.  Essential hypertension, benign     Musculoskeletal disorder     ruptured disc    Obesity, unspecified     Other and unspecified hyperlipidemia     Other malaise and fatigue     Sleep apnea     cpap    Type II or unspecified type diabetes mellitus without mention of complication, not stated as uncontrolled     Unspecified sleep apnea         Social History     Social History    Marital status:      Spouse name: N/A    Number of children: N/A    Years of education: N/A     Occupational History    Not on file. Social History Main Topics    Smoking status: Former Smoker     Quit date: 8/25/1998    Smokeless tobacco: Former User    Alcohol use Yes      Comment: occasional    Drug use: Not on file    Sexual activity: Not on file     Other Topics Concern    Not on file     Social History Narrative       Current Outpatient Prescriptions   Medication Sig Dispense Refill    topiramate (TOPAMAX) 25 mg tablet Take 75 mg by mouth nightly.       gabapentin (NEURONTIN) 300 mg capsule Take 1-2 caps po q am and 2 caps po q pm as directed. (Patient taking differently: Take 300 mg by mouth two (2) times a day. Take 1-2 caps po q am and 2 caps po q pm as directed.) 120 Cap 2    ibuprofen (MOTRIN) 800 mg tablet Take 800 mg by mouth two (2) times daily as needed.  diltiazem CD (CARTIA XT) 240 mg ER capsule Take  by mouth daily.  metformin (GLUMETZA) 500 mg TG24 24 hour tablet Take 500 mg by mouth daily.  glimepiride (AMARYL) 4 mg tablet Take 4 mg by mouth every morning.  simvastatin (ZOCOR) 20 mg tablet Take 20 mg by mouth nightly.  lisinopril (PRINIVIL, ZESTRIL) 2.5 mg tablet Take 2.5 mg by mouth daily. No Known Allergies      REVIEW OF SYSTEMS    Constitutional: Negative for fever, chills, or weight change. Respiratory: Negative for cough or shortness of breath. Cardiovascular: Negative for chest pain or palpitations. Gastrointestinal: Negative for acid reflux, change in bowel habits, or constipation. Genitourinary: Negative for dysuria and flank pain. Musculoskeletal: Positive for lumbar pain. Skin: Negative for rash. Neurological: Negative for headaches, dizziness, or numbness. Endo/Heme/Allergies: Negative for increased bruising. Psychiatric/Behavioral: Negative for difficulty with sleep. PHYSICAL EXAMINATION  Visit Vitals    /81    Pulse 77    Temp 98.4 °F (36.9 °C) (Oral)    Resp 18    Ht 6' (1.829 m)    Wt 290 lb (131.5 kg)    SpO2 96%    BMI 39.33 kg/m2       Constitutional: Awake, alert, and in no acute distress. Neurological: 1+ symmetrical DTRs in the lower extremities. Sensation to light touch is intact. Skin: warm, dry, and intact. Musculoskeletal: No Tenderness to palpation in the lower lumbar region. No pain with extension, axial loading, or forward flexion. No pain with internal or external rotation of his hips. Negative straight leg raise bilaterally.      Hip Flex  Quads Hamstrings Ankle DF EHL Ankle PF   Right +4/5 +4/5 +4/5 +4/5 +4/5 +4/5   Left +4/5 +4/5 +4/5 +4/5 +4/5 +4/5     IMAGING:    Lumbar spine MRI from 07/31/2017 was personally reviewed with the patient and demonstrated:    Results from [place] encounter on 07/31/17   MRI LUMB SPINE W WO CONT   Narrative Sagittal and axial multisequence MR images of lumbar spine were obtained without  and with 13.5 cc Gadavist gadolinium IV contrast.    HISTORY: Chronic lower back pain and left leg pain. Previous laminectomy in  1990s    COMPARISON: MRI October 2, 2009    Trace retrolisthesis of L5 on S1, slightly more conspicuous than before. Significant loss of discal height with endplate irregularity at L4-L5, with  spondylosis. Mild discogenic endplate fatty changes and edema at L4-L5. No  compression fracture or pathologic marrow signal otherwise. Conus medullaris  ends at L1 with normal morphology and signal intensity. No abnormal enhancement. T12 and L1, L1-L2 and L2-L3: No disc herniation, central or foraminal stenosis. Maintained normal discal height and signal intensity. No disc desiccation. L3-L4: Mild left posterolateral disc protrusion. No central stenosis. Mild left  facet and ligamentous hypertrophy. No significant foraminal stenosis. No nerve  root compression. L4-L5: Left laminotomy. No significant distortion of the thecal sac. No  arachnoiditis. Mild posterior disc bulge with endplate spondylosis. No central  stenosis. Facet hypertrophy with mild bilateral foraminal narrowing but no  exiting nerve root compression. Slightly prominent anterior epidural fat with  mild indentation to the left anterior lateral thecal sac. L5-S1: Mild posterior disc bulge with left posterior lateral annular tear. No  central stenosis. Facet and ligamentous hypertrophy with moderate left foraminal  stenosis. Mild compression of left exiting L5 nerve root, but unchanged from  2009. Impression IMPRESSION:  1. Left L5 foraminal stenosis with mild compression of the left exiting L5 nerve  root, but unchanged from 2009. Left posterior lateral annular tear at L5-S1  which is more conspicuous than before. 2. Prominent anterior epidural fat at L4-L5, with mild indentation to the left  anterolateral thecal sac. Disc disease at L4-L5 with spondylolisthesis otherwise  stable. 3. Mild left posterolateral disc protrusion L3-L4 with no significant mass  effect. Written by Marbin Victoria, as dictated by Brandon Greco MD.  I, Dr. Brandon Greco confirm that all documentation is accurate.

## 2017-11-28 NOTE — PATIENT INSTRUCTIONS

## 2018-01-19 DIAGNOSIS — M79.2 NEURITIS: ICD-10-CM

## 2018-01-19 RX ORDER — TOPIRAMATE 25 MG/1
TABLET ORAL
Qty: 270 TAB | Refills: 0 | Status: SHIPPED | OUTPATIENT
Start: 2018-01-19 | End: 2018-04-23 | Stop reason: SDUPTHER

## 2018-04-23 DIAGNOSIS — M79.2 NEURITIS: ICD-10-CM

## 2018-04-23 RX ORDER — TOPIRAMATE 25 MG/1
TABLET ORAL
Qty: 270 TAB | Refills: 0 | Status: SHIPPED | OUTPATIENT
Start: 2018-04-23 | End: 2018-07-31 | Stop reason: SDUPTHER

## 2018-04-24 NOTE — TELEPHONE ENCOUNTER
Called patient and LVM for pt to call and schedule a f/u appt with Dr Bryan Younger for any further refills.

## 2018-07-19 ENCOUNTER — TELEPHONE (OUTPATIENT)
Dept: ORTHOPEDIC SURGERY | Age: 58
End: 2018-07-19

## 2018-07-19 DIAGNOSIS — M79.2 NEURITIS: ICD-10-CM

## 2018-07-19 RX ORDER — TOPIRAMATE 25 MG/1
75 TABLET ORAL EVERY EVENING
Qty: 270 TAB | Refills: 0 | OUTPATIENT
Start: 2018-07-19

## 2018-07-19 NOTE — TELEPHONE ENCOUNTER
Last Visit: 11/28/2017 with MD An Gallo    Next Appointment: noted to f/u prn   Previous Refill Encounters: 04/23/2018 per NP Ezio #[id number]     Requested Prescriptions     Pending Prescriptions Disp Refills    topiramate (TOPAMAX) 25 mg tablet 270 Tab 0     Sig: Take 3 Tabs by mouth every evening.

## 2018-07-19 NOTE — TELEPHONE ENCOUNTER
Pt has not been seen since November -- we can give 1 month supply and he can follow up with NP for further refills or can try to get from PCP

## 2018-07-20 NOTE — TELEPHONE ENCOUNTER
Called and lm for pt to call office in regards to Rx.     Call was in regards to previous Rx (Pt has not been seen since November -- we can give 1 month supply and he can follow up with NP for further refills or can try to get from PCP)

## 2018-07-20 NOTE — TELEPHONE ENCOUNTER
Patient called back . Richard Bowen he was returning Erin 150. Call. Patient said \" I am getting my Refills from my family doctor \". \" Thank you\". And then the patient hung up. Did not request a call back. Patient tel. [phone].

## 2018-07-31 ENCOUNTER — OFFICE VISIT (OUTPATIENT)
Dept: ORTHOPEDIC SURGERY | Age: 58
End: 2018-07-31

## 2018-07-31 VITALS
BODY MASS INDEX: 40.63 KG/M2 | TEMPERATURE: 98.7 F | RESPIRATION RATE: 16 BRPM | WEIGHT: 300 LBS | HEIGHT: 72 IN | HEART RATE: 88 BPM | SYSTOLIC BLOOD PRESSURE: 154 MMHG | OXYGEN SATURATION: 95 % | DIASTOLIC BLOOD PRESSURE: 96 MMHG

## 2018-07-31 DIAGNOSIS — F32.A DEPRESSION, UNSPECIFIED DEPRESSION TYPE: ICD-10-CM

## 2018-07-31 DIAGNOSIS — M43.16 SPONDYLOLISTHESIS AT L4-L5 LEVEL: ICD-10-CM

## 2018-07-31 DIAGNOSIS — H93.13 TINNITUS OF BOTH EARS: ICD-10-CM

## 2018-07-31 DIAGNOSIS — M79.2 NEURITIS: ICD-10-CM

## 2018-07-31 DIAGNOSIS — M47.816 LUMBAR FACET ARTHROPATHY: Primary | ICD-10-CM

## 2018-07-31 RX ORDER — TOPIRAMATE 25 MG/1
75 TABLET ORAL 2 TIMES DAILY
Qty: 540 TAB | Refills: 0 | Status: SHIPPED | OUTPATIENT
Start: 2018-07-31 | End: 2018-07-31 | Stop reason: SDUPTHER

## 2018-07-31 RX ORDER — DULOXETIN HYDROCHLORIDE 30 MG/1
30 CAPSULE, DELAYED RELEASE ORAL DAILY
Qty: 30 CAP | Refills: 1 | Status: SHIPPED | OUTPATIENT
Start: 2018-07-31 | End: 2018-08-14

## 2018-07-31 RX ORDER — TOPIRAMATE 25 MG/1
75 TABLET ORAL 2 TIMES DAILY
Qty: 60 TAB | Refills: 0 | Status: SHIPPED | OUTPATIENT
Start: 2018-07-31

## 2018-07-31 NOTE — PROGRESS NOTES
[facility] [address] 
[address], [address] [place], [address] [place] [address] Street Phone: [phone] Fax: [phone] [place] Show : 1960 PCP: Alyssia Oneil MD 
 
PROGRESS NOTE HISTORY OF PRESENT ILLNESS: 
Chief Complaint Patient presents with  
 Hip Pain Right hip pain  Back Pain  Knee Pain Right knee pain Tanner Morfin is a 61 y.o.  male with history of lumbar pain. He was last seen with Dr. Abelino Beavers in 2017. He was taking ibuprofen 800 mg 1 tab QAM, Neurontin 300 mg 1 tab QAM and 1 tab QHS, and Topamax 25 mg 3 tabs QHS with benefit. Today, he states his needs a med refill. He continues to take all three with benefit. He started taking 25 mg # tabs QAM and 2 tabs QHS. This covers his pain better. He comes in today stating he is applying for SSD. He is having right> Left hip pain. He has radiating left leg pain, from his left buttocks to his posterior thigh to his calf and bottom of his foot to his great toe. This is a sharp shooting pain, intermittent in nature. He states he has a hard time standing up straight. He has had [facility] PT in the past with temporary improvement. He states he sometimes has a hard time walking up the steps. He feels his left leg is weaker. He states he also has right knee pain. He states he has been diagnosed with arthritis. He states his right foot will rotate outward when he walks. He also states he had a cochlear implant in the past . He admits to tinnitus today. He states the Gabapentin makes him feel off. He feels it alters his personality in \"a bad way. \" This has been going on for years but the Gabapentin greatly helps his all over pains. He did reduce the dose to 300 mg BID and this has helped some. He feels the topamax may be contributing to his memory. He feels he can't remember things as well. He feels a little depressed. He is very emotional today. He is tearful.  He feels his quality of life is very poor. He can not perform normal functions such as walking with out pain, having sex without pain, nor is he able to work without pain. He feels he is depressed. He would like to work but he can not due ot the severe pain. He has worked as a  in the past. He stopped this in 2015. He was a  before this. 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 He is applying for SSD. He states his daughter has moved in with him to assist with finances. He has not tried Cymbalta or Lyrica. He is not on any anti-depressants. He does not like taking medications. Denies bladder/bowel dysfunction, saddle paresthesia, weakness, gait disturbance, other neurological deficit, suicidal ideations. Pt at this time desires to continue with current care/proceed with medication evaluation. LMRI 7/31/17 IMPRESSION: 
1. Left L5 foraminal stenosis with mild compression of the left exiting L5 nerve 
root, but unchanged from 2009. Left posterior lateral annular tear at L5-S1 
which is more conspicuous than before. 2. Prominent anterior epidural fat at L4-L5, with mild indentation to the left 
anterolateral thecal sac. Disc disease at L4-L5 with spondylolisthesis otherwise 
stable. 3. Mild left posterolateral disc protrusion L3-L4 with no significant mass Effect. ASSESSMENT 
61 y.o. male with lumbar pain. Diagnoses and all orders for this visit: 1. Lumbar facet arthropathy ([facility] [address] -     topiramate (TOPAMAX) 25 mg tablet; Take 3 Tabs by mouth two (2) times a day. -     DULoxetine (CYMBALTA) 30 mg capsule; Take 1 Cap by mouth daily. 2. Neuritis -     topiramate (TOPAMAX) 25 mg tablet; Take 3 Tabs by mouth two (2) times a day. -     DULoxetine (CYMBALTA) 30 mg capsule; Take 1 Cap by mouth daily. 3. Spondylolisthesis at L4-L5 level -     topiramate (TOPAMAX) 25 mg tablet; Take 3 Tabs by mouth two (2) times a day. -     DULoxetine (CYMBALTA) 30 mg capsule;  Take 1 Cap by mouth daily. 4. Depression, unspecified depression type -     topiramate (TOPAMAX) 25 mg tablet; Take 3 Tabs by mouth two (2) times a day. -     DULoxetine (CYMBALTA) 30 mg capsule; Take 1 Cap by mouth daily. 5. Tinnitus of both ears 
-     REFERRAL TO NEUROLOGY IMPRESSION/PLAN 
 
1) Pt was given information on back care. 2) Increase Topamax to 75 mg BID. Taper off gabapentin. Trial of Cymbalta 30 mg. We discussed many medication options today. He will call me with any update if he is unable to taper off gabapentin or has any side effects from Cymbalta. He understand we may have to increase the Cymbalta to BID or 60 mg daily. 4) He can try 3-5 mg OTC melatonin for sleep in 1 week. 5) He is applying for SSD. He will drop the form off. He will also have his other doctors fill this out. 6) Mr. Nilo Rosas has a reminder for a \"due or due soon\" health maintenance. I have asked that he contact his primary care provider, Xu Last MD, for follow-up on this health maintenance. 7) We have informed patient to notify us for immediate appointment if he has any worsening neurogical symptoms or if an emergency situation presents, then call 911 
8) Pt will follow-up in 1 month for med fu. 9) Referral to neurology for tinnitus. PAST MEDICAL HISTORY Past Medical History:  
Diagnosis Date  Atrial fibrillation ([facility] [address]  Cardiac echocardiogram 09/05/2014 EF 55%. No WMA. Mild LVH. Gr 1 DDfx. RVSP 20 mmHg. No significant valvular heart disease. Unchanged from study of 11/23/10.  Cardiac Holter monitor study 09/24/2016 Sinus rhythm. Avg HR 71 bpm (range  bpm). One 4-beat run of SVT. No pause >2.1 secs.  Cardiac nuclear imaging test 03/15/2013 Mild, fixed inferobasal defect likely artifact. No WMA. EF 60%.  Essential hypertension, benign  Musculoskeletal disorder   
 ruptured disc  Obesity, unspecified  Other and unspecified hyperlipidemia  Other malaise and fatigue  Sleep apnea   
 cpap  Type II or unspecified type diabetes mellitus without mention of complication, not stated as uncontrolled  Unspecified sleep apnea MEDICATIONS Current Outpatient Prescriptions Medication Sig Dispense Refill  topiramate (TOPAMAX) 25 mg tablet Take 3 Tabs by mouth two (2) times a day. 60 Tab 0  
 DULoxetine (CYMBALTA) 30 mg capsule Take 1 Cap by mouth daily. 30 Cap 1  
 gabapentin (NEURONTIN) 300 mg capsule Take 1-2 caps po q am and 2 caps po q pm as directed. (Patient taking differently: Take 300 mg by mouth two (2) times a day. Take 1-2 caps po q am and 2 caps po q pm as directed.) 120 Cap 2  
 metformin (GLUMETZA) 500 mg TG24 24 hour tablet Take 500 mg by mouth daily.  glimepiride (AMARYL) 4 mg tablet Take 4 mg by mouth every morning.  ibuprofen (MOTRIN) 800 mg tablet Take 800 mg by mouth two (2) times daily as needed. ALLERGIES No Known Allergies SOCIAL HISTORY Social History Social History  Marital status:  Spouse name: N/A  
 Number of children: N/A  
 Years of education: N/A Occupational History  Not on file. Social History Main Topics  Smoking status: Former Smoker Quit date: 8/25/1998  Smokeless tobacco: Former User  Alcohol use Yes Comment: occasional  
 Drug use: Not on file  Sexual activity: Not on file Other Topics Concern  Not on file Social History Narrative SUBJECTIVE Pain Scale: 6/10 Pain Assessment  7/31/2018 Location of Pain Hip Location Modifiers Right Severity of Pain 6 Quality of Pain Throbbing;Aching; [place]; Other (Comment) Quality of Pain Comment numbness and tingling Duration of Pain Persistent Frequency of Pain Constant Aggravating Factors Standing;Walking;Stairs; Bending Aggravating Factors Comment - Limiting Behavior No  
Relieving Factors Rest;Other (Comment) Relieving Factors Comment Topamax and Gabapentin Result of Injury No  
 
 
Accompanied by self. REVIEW OF SYSTEMS 
ROS Constitutional: Negative for fever, chills, or weight change. Respiratory: Negative for cough or shortness of breath. Cardiovascular: Negative for chest pain or palpitations. Gastrointestinal: Negative for acid reflux, change in bowel habits, or constipation. Genitourinary: Negative for incontinence, dysuria and flank pain. Musculoskeletal: Positive for lumbar pain. Skin: Negative for rash. Neurological: Negative for headaches, dizziness, or numbness. Endo/Heme/Allergies: Negative . Psychiatric/Behavioral: Negative. PHYSICAL EXAMINATION Visit Vitals  BP (!) 154/96 (BP 1 Location: Left arm, BP Patient Position: Sitting)  Pulse 88  Temp 98.7 °F (37.1 °C) (Oral)  Resp 16  
 Ht 6' (1.829 m)  Wt 300 lb (136.1 kg)  SpO2 95%  BMI 40.69 kg/m2 Constitutional: Well developed,  well nourished,  awake, alert, and in no acute distress. Neurological:  Sensation to light touch is intact. Psychiatric: Affect and mood are appropriate. Integumentary: No rashes or abrasions noted on exposed areas,  warm, dry and intact. Cardiovascular/Peripheral Vascular:  No peripheral edema is noted. Lymphatic:  No evidence of lymphedema. No cervical lymphadenopathy. SPINE/MUSCULOSKELETAL EXAM 
 
 
Lumbar spine: No rash, ecchymosis, or gross obliquity. No fasciculations. No focal atrophy is noted. Range of motion is intact. No Tenderness to palpation . SI joints non-tender. Trochanters non tender. Musculoskeletal:  No pain with extension, axial loading, or forward flexion. No pain with internal or external rotation of his hips. MOTOR Hip Flex  Quads Hamstrings Ankle DF EHL Ankle PF Right +4/5 +4/5 +4/5 +4/5 +4/5 +4/5 Left +4/5 +4/5 +4/5 +4/5 +4/5 +4/5 Straight Leg raise negative bilaterally. normal gait and station Ambulation without assistive device.  full weight bearing, non-antalgic gait.  
 
Peace Sprague, NP

## 2018-07-31 NOTE — PATIENT INSTRUCTIONS
Duloxetine (By mouth) Duloxetine (doo-LOX-[id number]-teen) Treats depression, anxiety, diabetic peripheral neuropathy, fibromyalgia, and chronic muscle or bone pain. This medicine is an SSNRI. Brand Name(s): Cymbalta, DermacinRx DPN Eleazar Gomezs There may be other brand names for this medicine. When This Medicine Should Not Be Used: This medicine is not right for everyone. Do not use it if you had an allergic reaction to duloxetine. How to Use This Medicine:  
Capsule, Delayed Release Capsule · Take your medicine as directed. Your dose may need to be changed several times to find what works best for you. · Delayed-release capsule: Swallow the capsule whole. Do not crush, chew, break, or open it. · This medicine should come with a Medication Guide. Ask your pharmacist for a copy if you do not have one. · Missed dose: Take a dose as soon as you remember. If it is almost time for your next dose, wait until then and take a regular dose. Do not take extra medicine to make up for a missed dose. · Store the medicine in a closed container at room temperature, away from heat, moisture, and direct light. Drugs and Foods to Avoid: Ask your doctor or pharmacist before using any other medicine, including over-the-counter medicines, vitamins, and herbal products. · Do not take duloxetine if you have used an MAO inhibitor (MAOI) within the past 14 days. Do not start taking an MAO inhibitor within 5 days of stopping duloxetine. · Some medicines can affect how duloxetine works. Tell your doctor if you are using any of the following: 
¨ Buspirone, cimetidine, ciprofloxacin, enoxacin, fentanyl, lithium, Genesis's wort, theophylline, tramadol, tryptophan, or warfarin ¨ Amphetamines ¨ Blood pressure medicine ¨ Diuretic (water pill) ¨ Medicine for heart rhythm problems (including flecainide, propafenone, quinidine) ¨ Medicine to treat migraine headaches (including triptans) ¨ NSAID pain or arthritis medicine (including aspirin, celecoxib, diclofenac, ibuprofen, naproxen) ¨ Other medicine to treat depression or mood disorders (including amitriptyline, desipramine, fluoxetine, imipramine, nortriptyline, paroxetine) ¨ Phenothiazine medicine (including thioridazine) · Tell your doctor if you use anything else that makes you sleepy. Some examples are allergy medicine, narcotic pain medicine, and alcohol. · Do not drink alcohol while you are using this medicine. Warnings While Using This Medicine: · Tell your doctor if you are pregnant or breastfeeding, or if you have kidney disease, liver disease, diabetes, digestion problems, glaucoma, heart disease, high or low blood pressure, or problems with urination. Tell your doctor if you smoke or you have a history of seizures, or drug or alcohol addiction. · This medicine may cause the following problems:  
¨ Serious liver problems ¨ Serotonin syndrome (more likely when used with certain other medicines) ¨ Increased risk of bleeding problems ¨ Serious skin reactions ¨ Low sodium levels in the blood · This medicine can increase thoughts of suicide. Tell your doctor right away if you start to feel depressed and have thoughts about hurting yourself. · This medicine can cause changes in your blood pressure. This may make you dizzy or drowsy. Do not drive or do anything that could be dangerous until you know how this medicine affects you. Stand up slowly to avoid falls. · Do not stop using this medicine suddenly. Your doctor will need to slowly decrease your dose before you stop it completely. · Your doctor will check your progress and the effects of this medicine at regular visits. Keep all appointments. · Keep all medicine out of the reach of children. Never share your medicine with anyone. Possible Side Effects While Using This Medicine:  
Call your doctor right away if you notice any of these side effects: · Allergic reaction: Itching or hives, swelling in your face or hands, swelling or tingling in your mouth or throat, chest tightness, trouble breathing · Anxiety, restlessness, fever, fast heartbeat, sweating, muscle spasms, diarrhea, seeing or hearing things that are not there · Blistering, peeling, red skin rash · Confusion, weakness, muscle twitching · Dark urine or pale stools, nausea, vomiting, loss of appetite, stomach pain, yellow skin or eyes · Decrease in how much or how often you urinate · Eye pain, vision changes, seeing halos around lights · Feeling more energetic than usual 
· Lightheadedness, dizziness, or fainting · Unusual moods or behaviors, worsening depression, thoughts about hurting yourself, trouble sleeping · Unusual bleeding or bruising If you notice these less serious side effects, talk with your doctor: · Decrease in appetite or weight · Dry mouth, constipation, mild nausea · Unusual drowsiness, sleepiness, or tiredness If you notice other side effects that you think are caused by this medicine, tell your doctor. Call your doctor for medical advice about side effects. You may report side effects to FDA at 4-549-FDA-0739 © 2017 [address]  Information is for End User's use only and may not be sold, redistributed or otherwise used for commercial purposes. The above information is an  only. It is not intended as medical advice for individual conditions or treatments. Talk to your doctor, nurse or pharmacist before following any medical regimen to see if it is safe and effective for you.

## 2018-07-31 NOTE — MR AVS SNAPSHOT
[address] [place] [address] [place] [address] [address] [facility] [address] 
[phone] Patient: Bridget Gaucher MRN: [id number] Visit Information Date & Time Provider Department Dept. Phone Encounter #  
 7/31/2018 10:30 AM Martinez Taylor NP [facility] [phone] [id number] Follow-up Instructions Return in about 4 weeks (around 8/28/2018) for natanael. Upcoming Health Maintenance Date Due Hepatitis C Screening 1960 HEMOGLOBIN A1C Q6M 1960 FOOT EXAM Q1 10/23/1969 MICROALBUMIN Q1 10/23/1969 EYE EXAM RETINAL OR DILATED Q1 10/23/1969 Pneumococcal 19-64 Medium Risk (1 of 1 - PPSV23) 10/23/1978 DTaP/Tdap/Td series (1 - Tdap) 10/23/1980 FOBT Q 1 YEAR AGE 50-75 10/23/2009 LIPID PANEL Q1 11/23/2011 Influenza Age 5 to Adult 8/1/2018 Allergies as of 7/31/2018  Review Complete On: 7/31/2018 By: Sabina Cassidy LPN No Known Allergies Current Immunizations  Never Reviewed No immunizations on file. Not reviewed this visit You Were Diagnosed With   
  
 Codes Comments Lumbar facet arthropathy (HCC)    -  Primary ICD-10-CM: M46.96 
ICD-9-CM: 721.3 Neuritis     ICD-10-CM: M79.2 ICD-9-CM: 729.2 Spondylolisthesis at L4-L5 level     ICD-10-CM: M43.16 
ICD-9-CM: 756.12 Depression, unspecified depression type     ICD-10-CM: F32.9 ICD-9-CM: 015 Tinnitus of both ears     ICD-10-CM: H93.13 
ICD-9-CM: 388.30 Vitals BP Pulse Temp Resp Height(growth percentile) Weight(growth percentile) (!) 154/96 (BP 1 Location: Left arm, BP Patient Position: Sitting) 88 98.7 °F (37.1 °C) (Oral) 16 6' (1.829 m) 300 lb (136.1 kg) SpO2 BMI Smoking Status 95% 40.69 kg/m2 Former Smoker BMI and BSA Data Body Mass Index Body Surface Area  
 40.69 kg/m 2 2.63 m 2 Preferred Pharmacy Pharmacy Name Phone [facility] #[id number] [facility], [address] [place] [address],[address] [facility] [phone] Your Updated Medication List  
  
   
This list is accurate as of 7/31/18 11:28 AM.  Always use your most recent med list.  
  
  
  
  
 DULoxetine 30 mg capsule Commonly known as:  CYMBALTA Take 1 Cap by mouth daily. gabapentin 300 mg capsule Commonly known as:  NEURONTIN Take 1-2 caps po q am and 2 caps po q pm as directed. glimepiride 4 mg tablet Commonly known as:  AMARYL Take 4 mg by mouth every morning. GLUMETZA 500 mg Tg24 24 hour tablet Generic drug:  metFORMIN Take 500 mg by mouth daily. ibuprofen 800 mg tablet Commonly known as:  MOTRIN Take 800 mg by mouth two (2) times daily as needed. topiramate 25 mg tablet Commonly known as:  TOPAMAX Take 3 Tabs by mouth two (2) times a day. Prescriptions Sent to Pharmacy Refills  
 topiramate (TOPAMAX) 25 mg tablet 0 Sig: Take 3 Tabs by mouth two (2) times a day. Class: Normal  
 Pharmacy: [facility]/pharmacy [address] [place] [address],[address] [place] [address] [place] [address] Ph #: [phone] Route: Oral  
 DULoxetine (CYMBALTA) 30 mg capsule 1 Sig: Take 1 Cap by mouth daily. Class: Normal  
 [facility] [address] [place] [address], [address] [place] [address],[address] [place] [address] [place] [address] Ph #: [phone] Route: Oral  
  
We Performed the Following REFERRAL TO NEUROLOGY [GPH69 Custom] Follow-up Instructions Return in about 4 weeks (around 8/28/2018) for natanael. Referral Information Referral ID Referred By Referred To  
  
 [id number] Homer FERRER Not Available Visits Status Start Date End Date 1 New Request 7/31/18 7/31/19 If your referral has a status of pending review or denied, additional information will be sent to support the outcome of this decision. Patient Instructions Duloxetine (By mouth) Duloxetine (doo-LOX-e-teen) Treats depression, anxiety, diabetic peripheral neuropathy, fibromyalgia, and chronic muscle or bone pain. This medicine is an SSNRI. Brand Name(s): Cymbalta, DermacinRx NORBERTN [place] There may be other brand names for this medicine. When This Medicine Should Not Be Used: This medicine is not right for everyone. Do not use it if you had an allergic reaction to duloxetine. How to Use This Medicine:  
Capsule, Delayed Release Capsule · Take your medicine as directed. Your dose may need to be changed several times to find what works best for you. · Delayed-release capsule: Swallow the capsule whole. Do not crush, chew, break, or open it. · This medicine should come with a Medication Guide. Ask your pharmacist for a copy if you do not have one. · Missed dose: Take a dose as soon as you remember. If it is almost time for your next dose, wait until then and take a regular dose. Do not take extra medicine to make up for a missed dose. · Store the medicine in a closed container at room temperature, away from heat, moisture, and direct light. Drugs and Foods to Avoid: Ask your doctor or pharmacist before using any other medicine, including over-the-counter medicines, vitamins, and herbal products. · Do not take duloxetine if you have used an MAO inhibitor (MAOI) within the past 14 days. Do not start taking an MAO inhibitor within 5 days of stopping duloxetine. · Some medicines can affect how duloxetine works. Tell your doctor if you are using any of the following: 
¨ Buspirone, cimetidine, ciprofloxacin, enoxacin, fentanyl, lithium, Genesis's wort, theophylline, tramadol, tryptophan, or warfarin ¨ Amphetamines ¨ Blood pressure medicine ¨ Diuretic (water pill) ¨ Medicine for heart rhythm problems (including flecainide, propafenone, quinidine) ¨ Medicine to treat migraine headaches (including triptans) ¨ NSAID pain or arthritis medicine (including aspirin, celecoxib, diclofenac, ibuprofen, naproxen) ¨ Other medicine to treat depression or mood disorders (including amitriptyline, desipramine, fluoxetine, imipramine, nortriptyline, paroxetine) ¨ Phenothiazine medicine (including thioridazine) · Tell your doctor if you use anything else that makes you sleepy. Some examples are allergy medicine, narcotic pain medicine, and alcohol. · Do not drink alcohol while you are using this medicine. Warnings While Using This Medicine: · Tell your doctor if you are pregnant or breastfeeding, or if you have kidney disease, liver disease, diabetes, digestion problems, glaucoma, heart disease, high or low blood pressure, or problems with urination. Tell your doctor if you smoke or you have a history of seizures, or drug or alcohol addiction. · This medicine may cause the following problems:  
¨ Serious liver problems ¨ Serotonin syndrome (more likely when used with certain other medicines) ¨ Increased risk of bleeding problems ¨ Serious skin reactions ¨ Low sodium levels in the blood · This medicine can increase thoughts of suicide. Tell your doctor right away if you start to feel depressed and have thoughts about hurting yourself. · This medicine can cause changes in your blood pressure. This may make you dizzy or drowsy. Do not drive or do anything that could be dangerous until you know how this medicine affects you. Stand up slowly to avoid falls. · Do not stop using this medicine suddenly. Your doctor will need to slowly decrease your dose before you stop it completely. · Your doctor will check your progress and the effects of this medicine at regular visits. Keep all appointments. · Keep all medicine out of the reach of children. Never share your medicine with anyone. Possible Side Effects While Using This Medicine:  
Call your doctor right away if you notice any of these side effects: · Allergic reaction: Itching or hives, swelling in your face or hands, swelling or tingling in your mouth or throat, chest tightness, trouble breathing · Anxiety, restlessness, fever, fast heartbeat, sweating, muscle spasms, diarrhea, seeing or hearing things that are not there · Blistering, peeling, red skin rash · Confusion, weakness, muscle twitching · Dark urine or pale stools, nausea, vomiting, loss of appetite, stomach pain, yellow skin or eyes · Decrease in how much or how often you urinate · Eye pain, vision changes, seeing halos around lights · Feeling more energetic than usual 
· Lightheadedness, dizziness, or fainting · Unusual moods or behaviors, worsening depression, thoughts about hurting yourself, trouble sleeping · Unusual bleeding or bruising If you notice these less serious side effects, talk with your doctor: · Decrease in appetite or weight · Dry mouth, constipation, mild nausea · Unusual drowsiness, sleepiness, or tiredness If you notice other side effects that you think are caused by this medicine, tell your doctor. Call your doctor for medical advice about side effects. You may report side effects to FDA at 3-957-FHZ-3549 © 2017 [facility] Information is for End User's use only and may not be sold, redistributed or otherwise used for commercial purposes. The above information is an  only. It is not intended as medical advice for individual conditions or treatments. Talk to your doctor, nurse or pharmacist before following any medical regimen to see if it is safe and effective for you. Introducing [facility]! Dear Pablito Cosme: Thank you for requesting a [employer] account. Our records indicate that you already have an active [employer] account. You can access your account anytime at [email or web address] Did you know that you can access your hospital and ER discharge instructions at any time in [employer]? You can also review all of your test results from your hospital stay or ER visit. Additional Information If you have questions, please visit the Frequently Asked Questions section of the [employer] website at [email or web address] Remember, [employer] is NOT to be used for urgent needs. For medical emergencies, dial 911. Now available from your iPhone and Android! Please provide this summary of care documentation to your next provider. Your primary care clinician is listed as Beverley Urias. If you have any questions after today's visit, please call [phone].

## 2018-08-01 ENCOUNTER — TELEPHONE (OUTPATIENT)
Dept: ORTHOPEDIC SURGERY | Age: 58
End: 2018-08-01

## 2018-08-01 NOTE — TELEPHONE ENCOUNTER
Patient stated that he has yet to receive a phone call from neurology regarding an appointment. I explained to the patient that someone would be in contact with him before the week is out.

## 2018-08-01 NOTE — TELEPHONE ENCOUNTER
Pt is calling because he has questions about his neurology referral. Pt may be reached at [phone].

## 2018-08-07 ENCOUNTER — TELEPHONE (OUTPATIENT)
Dept: ORTHOPEDIC SURGERY | Age: 58
End: 2018-08-07

## 2018-08-07 NOTE — TELEPHONE ENCOUNTER
Returned call to patient, verified , per patient, he is down to one gabapetin of 300 mg at bedtime, per patient, he is stopping his gabapentin now, and will discontinue his cymbalta tapering off by taking 1 every other day x 1 week, then stopping medication all together. Patient will continue Topamax 25 mg 3 po BID. Patient verbalized agreement/understanding. No further action required at this time.

## 2018-08-07 NOTE — TELEPHONE ENCOUNTER
Patient called and said he is having some allergic reaction to the new medications. That he was placed on Topamax and Cymbalta medications. Patient would like a call back as soon as possible. Patient said he can not sleep and is feeling nervous and other problems. Patient tel. [phone].

## 2018-08-13 ENCOUNTER — TELEPHONE (OUTPATIENT)
Dept: ORTHOPEDIC SURGERY | Age: 58
End: 2018-08-13

## 2018-08-13 NOTE — TELEPHONE ENCOUNTER
I called the patient and he was calm and not crying. He stated he wanted to see ARLIN Holguin as he is having side effects from the medications that she has him on and perhaps withdrawal from getting off the Neurontin.   Please put him on ARLIN Holguin's schedule for 8/14/18 at 9:15 am

## 2018-08-13 NOTE — TELEPHONE ENCOUNTER
Patient states he needs help. Patient states he is not having pain but having mental issues. He is experiencing rapid breathing just being on the Topamax and diabetic meds. He is uncoordinated, depressed, feels like he is having a mental breakdown. He can't make it to the 28th. Patient is very emotional and is crying on the phone. He said he has seen NP Saurav Moe and Dinah Adam called him last week and told him to stop the other meds and he thinks that was a good thing. But he needs help.  Patient can be reached at [phone]

## 2018-08-14 ENCOUNTER — OFFICE VISIT (OUTPATIENT)
Dept: ORTHOPEDIC SURGERY | Age: 58
End: 2018-08-14

## 2018-08-14 VITALS
TEMPERATURE: 98.3 F | RESPIRATION RATE: 16 BRPM | OXYGEN SATURATION: 97 % | WEIGHT: 296.6 LBS | DIASTOLIC BLOOD PRESSURE: 81 MMHG | SYSTOLIC BLOOD PRESSURE: 153 MMHG | HEART RATE: 75 BPM | BODY MASS INDEX: 40.17 KG/M2 | HEIGHT: 72 IN

## 2018-08-14 DIAGNOSIS — M43.16 SPONDYLOLISTHESIS AT L4-L5 LEVEL: ICD-10-CM

## 2018-08-14 DIAGNOSIS — M47.816 LUMBAR FACET ARTHROPATHY: Primary | ICD-10-CM

## 2018-08-14 DIAGNOSIS — F32.A DEPRESSION, UNSPECIFIED DEPRESSION TYPE: ICD-10-CM

## 2018-08-14 DIAGNOSIS — G89.29 OTHER CHRONIC PAIN: ICD-10-CM

## 2018-08-14 PROBLEM — E11.40 TYPE 2 DIABETES MELLITUS WITH DIABETIC NEUROPATHY (HCC): Status: ACTIVE | Noted: 2018-08-14

## 2018-08-14 PROBLEM — E66.01 OBESITY, MORBID (HCC): Status: ACTIVE | Noted: 2018-08-14

## 2018-08-14 RX ORDER — TRAZODONE HYDROCHLORIDE 50 MG/1
50 TABLET ORAL
Qty: 7 TAB | Refills: 0 | Status: SHIPPED | OUTPATIENT
Start: 2018-08-14

## 2018-08-14 NOTE — PROGRESS NOTES
[address]  [address], [address]  [place]  Phone: [phone]  Fax: [phone]    María Pro  : 1960  PCP: Lew Ruiz MD    PROGRESS NOTE    HISTORY OF PRESENT ILLNESS:  Chief Complaint   Patient presents with    Back Pain     Lower     Leg Pain     RLE    Follow-up     Med issues     Annie Tai is a 61 y.o.  male with history of lumbar pain. He saw Dr. An Gallo last November. I saw him about 3 weeks ago. He was taking ibuprofen 800 mg 1 tab QAM, Neurontin 300 mg 1 tab QAM and 1 tab QHS, and Topamax 25 mg 3 tabs QHS with minimal benefit. He stated the Gabapentin made him feel \"off. \" He was very emotional and felt depressed. He was tearful. He felt is quality of life was poor. He could not perform normal daily activities without pain. We changed his medications: we increased the Topamax to 75 mg BID and did a trial of Cymbalta 30 mg. We tapered off the Gabapentin. I referred him to Neurology for Tinnitus. He called in and was feeling off from the Cymbalta. We DCd this. He was having right> Left hip pain. He has radiating left leg pain, from his left buttocks to his posterior thigh to his calf and bottom of his foot to his great toe. This is a sharp shooting pain, intermittent in nature. He states he has a hard time standing up straight. He has had [facility] PT in the past with temporary improvement. He states he sometimes has a hard time walking up the steps. He feels his left leg is weaker. He states he also has right knee pain. He states he has been diagnosed with arthritis. He states his right foot will rotate outward when he walks. He also states he had a cochlear implant in the past . He admits to tinnitus. Today, he is taking Topamax 75 mg BID. He is stopped the Gabapentin and Cymbalta. He states his bilateral hips hurt, R>L. His back continues to hurt. He has some numbness in his left calf.  This is the same pain as before. He states the pain is not as severe as it was in 2009. He states he is very emotional and sad. He denies radiating leg pains today. He is not tender to palpation. He feels his pain is more in his hip joint. This will ache when he walks. He is not sleeping at night. He states his wife tells him he is sleep talking. He denies feelings of sucididal ideations or homicide. He states he does want some help with his feelings as it is affecting his life. His PCP is Dr. Abdi Knox. Denies bladder/bowel dysfunction, saddle paresthesia, weakness, gait disturbance, or other neurological deficit. Pt at this time desires to continue with current care/proceed with medication evaluation. He has worked as a  in the past. He stopped this in 2015. He was a  before this. He states he stopped working in November as a [employer] man. He is applying for SSD. He states his daughter has moved in with him to assist with finances. LMRI 7/31/17  IMPRESSION:  1. Left L5 foraminal stenosis with mild compression of the left exiting L5 nerve  root, but unchanged from 2009. Left posterior lateral annular tear at L5-S1  which is more conspicuous than before. 2. Prominent anterior epidural fat at L4-L5, with mild indentation to the left  anterolateral thecal sac. Disc disease at L4-L5 with spondylolisthesis otherwise  stable. 3. Mild left posterolateral disc protrusion L3-L4 with no significant mass  Effect.       ASSESSMENT  61 y.o. male with lumbar and right hip. Diagnoses and all orders for this visit:    1. Lumbar facet arthropathy (HCC)  -     traZODone (DESYREL) 50 mg tablet; Take 1 Tab by mouth nightly. Indications: insomnia associated with depression    2. Spondylolisthesis at L4-L5 level  -     traZODone (DESYREL) 50 mg tablet; Take 1 Tab by mouth nightly. Indications: insomnia associated with depression    3.  Depression, unspecified depression type  -     traZODone (DESYREL) 50 mg tablet; Take 1 Tab by mouth nightly. Indications: insomnia associated with depression    4. Other chronic pain  -     traZODone (DESYREL) 50 mg tablet; Take 1 Tab by mouth nightly. Indications: insomnia associated with depression       IMPRESSION/PLAN    1) Consulted with Dr. Mikala Saavedra. Dr. Mikala Saavedra came into see the patient and we all discussed the following treatment plan. 2) Has apt with Neurology on 8/22 for tinnitus. 3) Taper off Topamax. He will taper back to his baseline dose of 75 mg QHS. If he is feeling better on this dose, he can stay here. If he tapers all the way off, we can try Lyrica 50 mg BID tapering up. 4) Patient is not suicidal or homicidal. He denies thoughts of hurting himself or others. We have made a PCP about with Dr. Jessica Melendez on 8/20 at 1:45. 5) Trazadone 50 mg QHS, one week supply to get the patient to his FU with his PCP. 4) Mr. Clari Samayoa has a reminder for a \"due or due soon\" health maintenance. I have asked that he contact his primary care provider, Harjeet Lott MD, for follow-up on this health maintenance. 6) We have informed patient to notify us for immediate appointment if he has any worsening neurogical symptoms or if an emergency situation presents, then call [phone]  7) Pt will follow-up in on Friday for a med check. Denies thoughts of harming self or others. Pt has a good risk to benefit ratio which allows the pt to function in a home environment without side effects. PAST MEDICAL HISTORY  Past Medical History:   Diagnosis Date    Atrial fibrillation ([address]     Cardiac echocardiogram 09/05/2014    EF 55%. No WMA. Mild LVH. Gr 1 DDfx. RVSP 20 mmHg. No significant valvular heart disease. Unchanged from study of 11/23/10.  Cardiac Holter monitor study 09/24/2016    Sinus rhythm. Avg HR 71 bpm (range  bpm). One 4-beat run of SVT. No pause >2.1 secs.  Cardiac nuclear imaging test 03/15/2013    Mild, fixed inferobasal defect likely artifact. No WMA. EF 60%.  Essential hypertension, benign     Musculoskeletal disorder     ruptured disc    Obesity, unspecified     Other and unspecified hyperlipidemia     Other malaise and fatigue     Sleep apnea     cpap    Type II or unspecified type diabetes mellitus without mention of complication, not stated as uncontrolled     Unspecified sleep apnea         MEDICATIONS  Current Outpatient Prescriptions   Medication Sig Dispense Refill    traZODone (DESYREL) 50 mg tablet Take 1 Tab by mouth nightly. Indications: insomnia associated with depression 7 Tab 0    topiramate (TOPAMAX) 25 mg tablet Take 3 Tabs by mouth two (2) times a day. 60 Tab 0    metformin (GLUMETZA) 500 mg TG24 24 hour tablet Take 500 mg by mouth daily.  glimepiride (AMARYL) 4 mg tablet Take 4 mg by mouth every morning.  ibuprofen (MOTRIN) 800 mg tablet Take 800 mg by mouth two (2) times daily as needed.  gabapentin (NEURONTIN) 300 mg capsule Take 1-2 caps po q am and 2 caps po q pm as directed. (Patient taking differently: Take 300 mg by mouth two (2) times a day. Take 1-2 caps po q am and 2 caps po q pm as directed.) 120 Cap 2       ALLERGIES  No Known Allergies    SOCIAL HISTORY    Social History     Social History    Marital status:      Spouse name: N/A    Number of children: N/A    Years of education: N/A     Occupational History    Not on file. Social History Main Topics    Smoking status: Former Smoker     Quit date: 8/25/1998    Smokeless tobacco: Former User    Alcohol use Yes      Comment: occasional    Drug use: Not on file    Sexual activity: Not on file     Other Topics Concern    Not on file     Social History Narrative       SUBJECTIVE      Pain Scale: 8/10    Pain Assessment  8/14/2018   Location of Pain Back;Hip;Leg   Location Modifiers Right   Severity of Pain 8   Quality of Pain Aching; Sharp   Quality of Pain Comment -   Duration of Pain Persistent   Frequency of Pain Constant Aggravating Factors Bending;Standing;Walking   Aggravating Factors Comment -   Limiting Behavior Some   Relieving Factors Nothing   Relieving Factors Comment -   Result of Injury No       Accompanied by self. REVIEW OF SYSTEMS  ROS    Constitutional: Negative for fever, chills, or weight change. Respiratory: Negative for cough or shortness of breath. Cardiovascular: Negative for chest pain or palpitations. Gastrointestinal: Negative for acid reflux, change in bowel habits, or constipation. Genitourinary: Negative for incontinence, dysuria and flank pain. Musculoskeletal: Positive for lumbar and right hip pain. Skin: Negative for rash. Neurological: Negative for headaches, dizziness, or numbness. Endo/Heme/Allergies: Negative . Psychiatric/Behavioral: Negative. PHYSICAL EXAMINATION  Visit Vitals    /81    Pulse 75    Temp 98.3 °F (36.8 °C)    Resp 16    Ht 6' (1.829 m)    Wt 296 lb 9.6 oz (134.5 kg)    SpO2 97%    BMI 40.23 kg/m2       Constitutional: Well developed,  well nourished,  awake, alert, and in no acute distress. Neurological:  Sensation to light touch is intact. Psychiatric: Affect and mood are appropriate. Integumentary: No rashes or abrasions noted on exposed areas,  warm, dry and intact. Cardiovascular/Peripheral Vascular:  No peripheral edema is noted. Lymphatic:  No evidence of lymphedema. No cervical lymphadenopathy. SPINE/MUSCULOSKELETAL EXAM    Lumbar spine:  No rash, ecchymosis, or gross obliquity. No fasciculations. No focal atrophy is noted. Range of motion is intact. NoTenderness to palpation . SI joints non-tender. Trochanters non tender. Musculoskeletal:  No pain with extension, axial loading, or forward flexion. No pain with internal or external rotation of his hips.      MOTOR     Hip Flex  Quads Hamstrings Ankle DF EHL Ankle PF   Right +4/5 +4/5 +4/5 +4/5 +4/5 +4/5   Left +4/5 +4/5 +4/5 +4/5 +4/5 +4/5       Straight Leg raise negative bilaterally. normal gait and station    Ambulation without assistive device. full weight bearing, slighlty-antalgic gait.     Irma Betancourt NP

## 2018-08-14 NOTE — MR AVS SNAPSHOT
[address] [facility] 
 
 
 [address] [facility] [address] 
[phone] Patient: Wilhelmenia Eisenmenger MRN: [id number] DEK:[id number] Visit Information Date & Time Provider Department Dept. Phone Encounter #  
 8/14/2018  9:15 AM Krystle Ortiz NP [facility] [phone] [id number] Follow-up Instructions Return for 830 add on for buttery . Your Appointments 8/28/2018  9:00 AM  
Follow Up with Krystle Ortiz NP  
[facility] Appt Note: 4 wk f/u with ARLIN  
 [address] [facility] [address] [phone]  
  
   
 [address] [facility] [address] Upcoming Health Maintenance Date Due Hepatitis C Screening 1960 HEMOGLOBIN A1C Q6M 1960 FOOT EXAM Q1 10/23/1969 MICROALBUMIN Q1 10/23/1969 EYE EXAM RETINAL OR DILATED Q1 10/23/1969 Pneumococcal 19-64 Medium Risk (1 of 1 - PPSV23) 10/23/1978 DTaP/Tdap/Td series (1 - Tdap) 10/23/1980 FOBT Q 1 YEAR AGE 50-75 10/23/2009 LIPID PANEL Q1 11/23/2011 Influenza Age 5 to Adult 8/1/2018 Allergies as of 8/14/2018  Review Complete On: 8/14/2018 By: George Seymour No Known Allergies Current Immunizations  Never Reviewed No immunizations on file. Not reviewed this visit You Were Diagnosed With   
  
 Codes Comments Lumbar facet arthropathy (HCC)    -  Primary ICD-10-CM: M46.96 
ICD-9-CM: 721.3 Spondylolisthesis at L4-L5 level     ICD-10-CM: M43.16 
ICD-9-CM: 756.12 Depression, unspecified depression type     ICD-10-CM: F32.9 ICD-9-CM: 825 Other chronic pain     ICD-10-CM: G89.29 ICD-9-CM: 338.29 Vitals BP Pulse Temp Resp Height(growth percentile) Weight(growth percentile) 153/81 75 98.3 °F (36.8 °C) 16 6' (1.829 m) 296 lb 9.6 oz (134.5 kg) SpO2 BMI Smoking Status 97% 40.23 kg/m2 Former Smoker BMI and BSA Data Body Mass Index Body Surface Area  
 40.23 kg/m 2 2.61 m 2 Preferred Pharmacy Pharmacy Name Phone [id number] [place] [address], [address] [facility] [facility] [phone] Your Updated Medication List  
  
   
This list is accurate as of 8/14/18 10:18 AM.  Always use your most recent med list.  
  
  
  
  
 gabapentin 300 mg capsule Commonly known as:  NEURONTIN Take 1-2 caps po q am and 2 caps po q pm as directed. glimepiride 4 mg tablet Commonly known as:  AMARYL Take 4 mg by mouth every morning. GLUMETZA 500 mg Tg24 24 hour tablet Generic drug:  metFORMIN Take 500 mg by mouth daily. ibuprofen 800 mg tablet Commonly known as:  MOTRIN Take 800 mg by mouth two (2) times daily as needed. topiramate 25 mg tablet Commonly known as:  TOPAMAX Take 3 Tabs by mouth two (2) times a day. traZODone 50 mg tablet Commonly known as:  Zhou Sheller Take 1 Tab by mouth nightly. Indications: insomnia associated with depression Prescriptions Printed Refills  
 traZODone (DESYREL) 50 mg tablet 0 Sig: Take 1 Tab by mouth nightly. Indications: insomnia associated with depression Class: Print Route: Oral  
  
Follow-up Instructions Return for 830 add on for natanael . [facility]! Dear Claudette Matamoros: Thank you for requesting a Medlio account. Our records indicate that you already have an active Medlio account. You can access your account anytime at [email or web address] Did you know that you can access your hospital and ER discharge instructions at any time in Medlio? You can also review all of your test results from your hospital stay or ER visit. Additional Information If you have questions, please visit the Frequently Asked Questions section of the Medlio website at [email or web address] Remember, MyChart is NOT to be used for urgent needs. For medical emergencies, dial 911. Now available from your iPhone and Android! Please provide this summary of care documentation to your next provider. Your primary care clinician is listed as Lo Miller. If you have any questions after today's visit, please call [phone].

## 2018-08-17 ENCOUNTER — OFFICE VISIT (OUTPATIENT)
Dept: ORTHOPEDIC SURGERY | Age: 58
End: 2018-08-17

## 2018-08-17 VITALS
HEIGHT: 72 IN | RESPIRATION RATE: 17 BRPM | TEMPERATURE: 98 F | BODY MASS INDEX: 40.28 KG/M2 | SYSTOLIC BLOOD PRESSURE: 142 MMHG | DIASTOLIC BLOOD PRESSURE: 82 MMHG | OXYGEN SATURATION: 98 % | WEIGHT: 297.4 LBS | HEART RATE: 83 BPM

## 2018-08-17 DIAGNOSIS — M62.830 MUSCLE SPASM OF BACK: ICD-10-CM

## 2018-08-17 DIAGNOSIS — M47.816 LUMBAR FACET ARTHROPATHY: ICD-10-CM

## 2018-08-17 DIAGNOSIS — M79.2 NEURITIS: Primary | ICD-10-CM

## 2018-08-17 NOTE — PROGRESS NOTES
[address], [address]  [facility], [address]  Phone: [phone]  Fax: [phone]    Hayden Vilchis  : 1960  PCP: Asuncion Oppenheim, MD    PROGRESS NOTE    HISTORY OF PRESENT ILLNESS:  Chief Complaint   Patient presents with    Back Pain     Lower     Leg Pain     Right     Follow-up      Helena Meeks is a 61 y.o.  male with history of lumbar pain. He saw Dr. Chang Santana last November. I saw him about 6 weeks ago. He was taking ibuprofen 800 mg 1 tab QAM, Neurontin 300 mg 1 tab QAM and 1 tab QHS, and Topamax 25 mg 3 tabs QHS with minimal benefit. He stated the Gabapentin made him feel \"off. \" He was very emotional and felt depressed. He was tearful. He felt is quality of life was poor. He could not perform normal daily activities without pain. We changed his medications: we increased the Topamax to 75 mg BID and did a trial of Cymbalta 30 mg. We tapered off the Gabapentin. I referred him to Neurology for Tinnitus. He called in and was feeling off from the Cymbalta. We DCd this. He was having right> Left hip pain. He has radiating left leg pain, from his left buttocks to his posterior thigh to his calf and bottom of his foot to his great toe. This is a sharp shooting pain, intermittent in nature. He states he has a hard time standing up straight. He has had aqua PT in the past with temporary improvement. He states he sometimes has a hard time walking up the steps. He feels his left leg is weaker. He states he also has right knee pain. He states he has been diagnosed with arthritis. Sharona Busch states his right foot will rotate outward when he walks. He also states he had a cochlear implant in the past . He admits to tinnitus. He has an apt with Neurology . I saw him last on Tuesday. He was going to start tapering his Topamax back to QHS. We were going to see how he did on this dose.  If needed, we were going to taper him and try Lyirca 59 mg BID.  We made the patient an apt with his PCP, Dr. Marcie Mackey,  for Monday to be evaluated for depression. I cosnulted with Dr. Raquel Hu and we decided to give the patient a one week supply of trazodone to help with his sleep. We were hopeful if his sleep improved, he would feel better overall and his pain may become better. Patient was not suicidal or homicidal. He denied thoughts of hurting himself or others. Today, he states he is feeling some better. He is not happy but he is not sad. He has been taking the Trazadone and this does help greatly with his sleep. The problem is he is sleeping so soundly that when he wakes up he is very stiff and has a hard time walking to the bathroom and has to use his cane. He says he has been up since 5 am but he still feels a little drowsy. He states he has been taking this medication around midnight. He states he does this because he wants his wife to get some good rest and she goes to bed around 9. He is down to taking Topamax QHS. He is wondering if he can take this in the morning as opposed to the evening. He feels he gets relief and thinks it would be better to take this during the day. He states he feels he still has difficulty caring on a conversation. He states his short term memory is a little off. He says his number one pain right now his is right lumbar spine and his hip. He has the most pain with walking. Denies bladder/bowel dysfunction, saddle paresthesia, weakness, gait disturbance, or other neurological deficit. Pt at this time desires to  continue with current care/proceed with medication evaluation. He has worked as a  in the past. He stopped this in 2015. He was a  before this. 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 He is applying for SSD. He states his daughter has moved in with him to assist with finances. He states he has been in pain since the early 2000s.       [facility] 7/31/17  IMPRESSION:  1. Left L5 foraminal stenosis with mild compression of the left exiting L5 nerve  root, but unchanged from 2009. Left posterior lateral annular tear at L5-S1  which is more conspicuous than before. 2. Prominent anterior epidural fat at L4-L5, with mild indentation to the left  anterolateral thecal sac. Disc disease at L4-L5 with spondylolisthesis otherwise  stable. 3. Mild left posterolateral disc protrusion L3-L4 with no significant mass  Effect.        ASSESSMENT  61 y.o. male with lumbar pain. Diagnoses and all orders for this visit:    1. Neuritis    2. Lumbar facet arthropathy (HCC)    3. Muscle spasm of back       IMPRESSION/PLAN    1) Pt will see Dr. Doug Moreau on Monday and neuro on 8/22. 2) Continue Trazadone until he sees his PCP. He will discuss with his PCP a plan of action. He will take this earlier in the evening in hopes he can obtain 8-9 hours of sleep and this will resolve the drowsy morning feeling. 3) Trial of Topamax 25 mg  One in the morning, afternoon and evening as opposed to 75 mg at night. If this makes him to drowsy during the day, we will re-evaluate. He has failed Cymbalta and Gabapentin. 4) Follow up with Dr. An Gallo to discuss SI. 5) Mr. Moira Maza has a reminder for a \"due or due soon\" health maintenance. I have asked that he contact his primary care provider, Lew Ruiz MD, for follow-up on this health maintenance. 6) We have informed patient to notify us for immediate appointment if he has any worsening neurogical symptoms or if an emergency situation presents, then call 911  7) Pt will follow-up in 2-3 weeks for FU. 8) Trial of OTC tylenol 500 mg. He will try to take less motrin. He is taking 800 mg BID PRN. 9) we discussed a PM referral may be in his best interested. He will consider this. Risks and benefits of ongoing opiate therapy have been reviewed with the patient.  is appropriate. No pain behaviors. Denies thoughts of harming self or others.  Pt has a good risk to benefit ratio which allows the pt to function in a home environment without side effects. PAST MEDICAL HISTORY  Past Medical History:   Diagnosis Date    Atrial fibrillation ([address]     Cardiac echocardiogram 09/05/2014    EF 55%. No WMA. Mild LVH. Gr 1 DDfx. RVSP 20 mmHg. No significant valvular heart disease. Unchanged from study of 11/23/10.  Cardiac Holter monitor study 09/24/2016    Sinus rhythm. Avg HR 71 bpm (range  bpm). One 4-beat run of SVT. No pause >2.1 secs.  Cardiac nuclear imaging test 03/15/2013    Mild, fixed inferobasal defect likely artifact. No WMA. EF 60%.  Essential hypertension, benign     Musculoskeletal disorder     ruptured disc    Obesity, unspecified     Other and unspecified hyperlipidemia     Other malaise and fatigue     Sleep apnea     cpap    Type II or unspecified type diabetes mellitus without mention of complication, not stated as uncontrolled     Unspecified sleep apnea         MEDICATIONS  Current Outpatient Prescriptions   Medication Sig Dispense Refill    topiramate (TOPAMAX) 25 mg tablet Take 3 Tabs by mouth two (2) times a day. 60 Tab 0    glimepiride (AMARYL) 4 mg tablet Take 4 mg by mouth every morning.  ibuprofen (MOTRIN) 800 mg tablet Take 800 mg by mouth two (2) times daily as needed.  traZODone (DESYREL) 50 mg tablet Take 1 Tab by mouth nightly. Indications: insomnia associated with depression 7 Tab 0    gabapentin (NEURONTIN) 300 mg capsule Take 1-2 caps po q am and 2 caps po q pm as directed. (Patient taking differently: Take 300 mg by mouth two (2) times a day. Take 1-2 caps po q am and 2 caps po q pm as directed.) 120 Cap 2    metformin (GLUMETZA) 500 mg TG24 24 hour tablet Take 500 mg by mouth daily.          ALLERGIES  No Known Allergies    SOCIAL HISTORY    Social History     Social History    Marital status:      Spouse name: N/A    Number of children: N/A    Years of education: N/A Occupational History    Not on file. Social History Main Topics    Smoking status: Former Smoker     Quit date: 8/25/1998    Smokeless tobacco: Former User    Alcohol use Yes      Comment: occasional    Drug use: Not on file    Sexual activity: Not on file     Other Topics Concern    Not on file     Social History Narrative       SUBJECTIVE        Pain Scale: 8/10    Pain Assessment  8/14/2018   Location of Pain Back;Hip;Leg   Location Modifiers Right   Severity of Pain 8   Quality of Pain Aching; Sharp   Quality of Pain Comment -   Duration of Pain Persistent   Frequency of Pain Constant   Aggravating Factors Bending;Standing;Walking   Aggravating Factors Comment -   Limiting Behavior Some   Relieving Factors Nothing   Relieving Factors Comment -   Result of Injury No       Accompanied by self. REVIEW OF SYSTEMS  ROS    Constitutional: Negative for fever, chills, or weight change. Respiratory: Negative for cough or shortness of breath. Cardiovascular: Negative for chest pain or palpitations. Gastrointestinal: Negative for acid reflux, change in bowel habits, or constipation. Genitourinary: Negative for incontinence, dysuria and flank pain. Musculoskeletal: Positive for lumbar pain. Skin: Negative for rash. Neurological: Negative for headaches, dizziness, or numbness. Endo/Heme/Allergies: Negative . Psychiatric/Behavioral: Negative. PHYSICAL EXAMINATION  Visit Vitals    /82    Pulse 83    Temp 98 °F (36.7 °C)    Resp 17    Ht 6' (1.829 m)    Wt 297 lb 6.4 oz (134.9 kg)    SpO2 98%    BMI 40.33 kg/m2       Constitutional: Well developed,  well nourished,  awake, alert, and in no acute distress. Neurological:  Sensation to light touch is intact. Psychiatric: Affect and mood are appropriate. Integumentary: No rashes or abrasions noted on exposed areas,  warm, dry and intact. Cardiovascular/Peripheral Vascular:  No peripheral edema is noted.   Lymphatic:  No evidence of lymphedema. No cervical lymphadenopathy. SPINE/MUSCULOSKELETAL EXAM  Lumbar spine:  No rash, ecchymosis, or gross obliquity. No fasciculations. No focal atrophy is noted. Range of motion is intact. NoTenderness to palpation . SI joints non-tender. Trochanters non tender.       Musculoskeletal:  No pain with extension, axial loading, or forward flexion. No pain with internal or external rotation of his hips.      MOTOR       Hip Flex  Quads Hamstrings Ankle DF EHL Ankle PF   Right +4/5 +4/5 +4/5 +4/5 +4/5 +4/5   Left +4/5 +4/5 +4/5 +4/5 +4/5 +4/5         Straight Leg raise negative bilaterally.      normal gait and station     Ambulation without assistive device.  full weight bearing, slighlty-antalgic gait.     Artemio Hendrix, NP

## 2018-08-17 NOTE — MR AVS SNAPSHOT
[address] [facility] 
 
 
 [address] [facility] [address] 
[phone] Patient: Shameka Cornell MRN: [id number]:[id number] Visit Information Date & Time Provider Department Dept. Phone Encounter #  
 8/17/2018  8:30 AM Christopher Upton NP [facility] [phone] [id number] Follow-up Instructions Return for change FU to anabella Tong for add on. Your Appointments 8/28/2018  9:00 AM  
Follow Up with Christopher Upton NP  
[facility] ([address] [facility]) Appt Note: 4 wk f/u with ARLIN  
 [address] [facility] [address] [phone]  
  
   
 [address] [facility] [address] Upcoming Health Maintenance Date Due Hepatitis C Screening 1960 HEMOGLOBIN A1C Q6M 1960 FOOT EXAM Q1 10/23/1969 MICROALBUMIN Q1 10/23/1969 EYE EXAM RETINAL OR DILATED Q1 10/23/1969 Pneumococcal 19-64 Medium Risk (1 of 1 - PPSV23) 10/23/1978 DTaP/Tdap/Td series (1 - Tdap) 10/23/1980 FOBT Q 1 YEAR AGE 50-75 10/23/2009 LIPID PANEL Q1 11/23/2011 Influenza Age 5 to Adult 8/1/2018 Allergies as of 8/17/2018  Review Complete On: 8/17/2018 By: Siva Araiza No Known Allergies Current Immunizations  Never Reviewed No immunizations on file. Not reviewed this visit You Were Diagnosed With   
  
 Codes Comments Neuritis    -  Primary ICD-10-CM: M79.2 ICD-9-CM: 729.2 Lumbar facet arthropathy (HCC)     ICD-10-CM: M46.96 
ICD-9-CM: 721.3 Muscle spasm of back     ICD-10-CM: R38.181 ICD-9-CM: 724.8 Vitals BP Pulse Temp Resp Height(growth percentile) Weight(growth percentile) 142/82 83 98 °F (36.7 °C) 17 6' (1.829 m) 297 lb 6.4 oz (134.9 kg) SpO2 BMI Smoking Status 98% 40.33 kg/m2 Former Smoker BMI and BSA Data Body Mass Index Body Surface Area 40.33 kg/m 2 2.62 m 2 Preferred Pharmacy Pharmacy Name Phone [id number] [place] [address], [address] [facility] [facility] [phone] Your Updated Medication List  
  
   
This list is accurate as of 8/17/18  9:15 AM.  Always use your most recent med list.  
  
  
  
  
 gabapentin 300 mg capsule Commonly known as:  NEURONTIN Take 1-2 caps po q am and 2 caps po q pm as directed. glimepiride 4 mg tablet Commonly known as:  AMARYL Take 4 mg by mouth every morning. GLUMETZA 500 mg Tg24 24 hour tablet Generic drug:  metFORMIN Take 500 mg by mouth daily. ibuprofen 800 mg tablet Commonly known as:  MOTRIN Take 800 mg by mouth two (2) times daily as needed. topiramate 25 mg tablet Commonly known as:  TOPAMAX Take 3 Tabs by mouth two (2) times a day. traZODone 50 mg tablet Commonly known as:  Junella Boogie Take 1 Tab by mouth nightly. Indications: insomnia associated with depression Follow-up Instructions Return for change FU to Samuel on 9/4, ok for add on. Introducing [facility]! Dear Ceasar Ace: Thank you for requesting a Fiiiling account. Our records indicate that you already have an active Fiiiling account. You can access your account anytime at [email or web address] Did you know that you can access your hospital and ER discharge instructions at any time in Fiiiling? You can also review all of your test results from your hospital stay or ER visit. Additional Information If you have questions, please visit the Frequently Asked Questions section of the Fiiiling website at [email or web address] Remember, Fiiiling is NOT to be used for urgent needs. For medical emergencies, dial 911. Now available from your iPhone and Android! Please provide this summary of care documentation to your next provider. Your primary care clinician is listed as Mayra Lou. If you have any questions after today's visit, please call [phone].

## 2018-08-20 ENCOUNTER — OFFICE VISIT (OUTPATIENT)
Dept: ORTHOPEDIC SURGERY | Age: 58
End: 2018-08-20

## 2018-08-20 VITALS
HEART RATE: 78 BPM | OXYGEN SATURATION: 98 % | WEIGHT: 300 LBS | DIASTOLIC BLOOD PRESSURE: 66 MMHG | RESPIRATION RATE: 16 BRPM | TEMPERATURE: 98.2 F | BODY MASS INDEX: 40.63 KG/M2 | HEIGHT: 72 IN | SYSTOLIC BLOOD PRESSURE: 148 MMHG

## 2018-08-20 DIAGNOSIS — R42 DIZZINESS: ICD-10-CM

## 2018-08-20 DIAGNOSIS — M47.816 LUMBAR FACET ARTHROPATHY: Primary | ICD-10-CM

## 2018-08-20 DIAGNOSIS — M62.830 MUSCLE SPASM OF BACK: ICD-10-CM

## 2018-08-20 DIAGNOSIS — M79.2 NEURITIS: ICD-10-CM

## 2018-08-20 RX ORDER — KETOROLAC TROMETHAMINE 15 MG/ML
60 INJECTION, SOLUTION INTRAMUSCULAR; INTRAVENOUS ONCE
Qty: 1 VIAL | Refills: 0
Start: 2018-08-20 | End: 2018-08-20

## 2018-08-20 NOTE — MR AVS SNAPSHOT
[address] [place] [address] [address] [place] [address] 
[phone] Patient: Wilhelmenia Eisenmenger MRN: [id number] TKN:30/36/9086 Visit Information Date & Time Provider Department Dept. Phone Encounter #  
 8/20/2018  7:45 AM Doris Kim MD [facility] [phone] [id number] Follow-up Instructions Return in about 3 weeks (around 9/10/2018) for Medication follow up. Upcoming Health Maintenance Date Due Hepatitis C Screening 1960 HEMOGLOBIN A1C Q6M 1960 FOOT EXAM Q1 10/23/1969 MICROALBUMIN Q1 10/23/1969 EYE EXAM RETINAL OR DILATED Q1 10/23/1969 Pneumococcal 19-64 Medium Risk (1 of 1 - PPSV23) 10/23/1978 DTaP/Tdap/Td series (1 - Tdap) 10/23/1980 FOBT Q 1 YEAR AGE 50-75 10/23/2009 LIPID PANEL Q1 11/23/2011 Influenza Age 5 to Adult 8/1/2018 Allergies as of 8/20/2018  Review Complete On: 8/20/2018 By: Doris Kim MD  
 No Known Allergies Current Immunizations  Never Reviewed No immunizations on file. Not reviewed this visit You Were Diagnosed With   
  
 Codes Comments Lumbar facet arthropathy (HCC)    -  Primary ICD-10-CM: M46.96 
ICD-9-CM: 721.3 Neuritis     ICD-10-CM: M79.2 ICD-9-CM: 729.2 Muscle spasm of back     ICD-10-CM: R23.443 ICD-9-CM: 724.8 Dizziness     ICD-10-CM: T53 ICD-9-CM: 780.4 Vitals BP Pulse Temp Resp Height(growth percentile) Weight(growth percentile) 148/66 78 98.2 °F (36.8 °C) (Oral) 16 6' (1.829 m) 300 lb (136.1 kg) SpO2 BMI Smoking Status 98% 40.69 kg/m2 Former Smoker BMI and BSA Data Body Mass Index Body Surface Area  
 40.69 kg/m 2 2.63 m 2 Preferred Pharmacy Pharmacy Name Phone [id number], [place], operated [facility], [address] [facility] [facility] [phone] Your Updated Medication List  
  
   
 This list is accurate as of 8/20/18  8:59 AM.  Always use your most recent med list.  
  
  
  
  
 gabapentin 300 mg capsule Commonly known as:  NEURONTIN Take 1-2 caps po q am and 2 caps po q pm as directed. glimepiride 4 mg tablet Commonly known as:  AMARYL Take 4 mg by mouth every morning. GLUMETZA 500 mg Tg24 24 hour tablet Generic drug:  metFORMIN Take 500 mg by mouth daily. ibuprofen 800 mg tablet Commonly known as:  MOTRIN Take 800 mg by mouth two (2) times daily as needed. ketorolac 15 mg/mL Soln injection Commonly known as:  TORADOL  
4 mL by IntraMUSCular route once for 1 dose. topiramate 25 mg tablet Commonly known as:  TOPAMAX Take 3 Tabs by mouth two (2) times a day. traZODone 50 mg tablet Commonly known as:  Berniece Staggers Take 1 Tab by mouth nightly. Indications: insomnia associated with depression We Performed the Following KETOROLAC TROMETHAMINE INJ [ [facility]] NV THER/PROPH/DIAG INJECTION, SUBCUT/IM [id number] CPT(R)] Follow-up Instructions Return in about 3 weeks (around 9/10/2018) for Medication follow up. Patient Instructions Low Back Arthritis: Exercises Your Care Instructions Here are some examples of typical rehabilitation exercises for your condition. Start each exercise slowly. Ease off the exercise if you start to have pain. Your doctor or physical therapist will tell you when you can start these exercises and which ones will work best for you. When you are not being active, find a comfortable position for rest. Some people are comfortable on the floor or a medium-firm bed with a small pillow under their head and another under their knees. Some people prefer to lie on their side with a pillow between their knees. Don't stay in one position for too long. Take short walks (10 to 20 minutes) every 2 to 3 hours.  Avoid slopes, hills, and stairs until you feel better. Walk only distances you can manage without pain, especially leg pain. How to do the exercises Pelvic tilt 1. Lie on your back with your knees bent. 2. \"Brace\" your stomach-tighten your muscles by pulling in and imagining your belly button moving toward your spine. 3. Press your lower back into the floor. You should feel your hips and pelvis rock back. 4. Hold for 6 seconds while breathing smoothly. 5. Relax and allow your pelvis and hips to rock forward. 6. Repeat 8 to 12 times. Back stretches 1. Get down on your hands and knees on the floor. 2. Relax your head and allow it to droop. Round your back up toward the ceiling until you feel a nice stretch in your upper, middle, and lower back. Hold this stretch for as long as it feels comfortable, or about 15 to 30 seconds. 3. Return to the starting position with a flat back while you are on your hands and knees. 4. Let your back sway by pressing your stomach toward the floor. Lift your buttocks toward the ceiling. 5. Hold this position for 15 to 30 seconds. 6. Repeat 2 to 4 times. Follow-up care is a key part of your treatment and safety. Be sure to make and go to all appointments, and call your doctor if you are having problems. It's also a good idea to know your test results and keep a list of the medicines you take. Where can you learn more? Go to http://jimbo-faith.info/. Enter L919 in the search box to learn more about \"Low Back Arthritis: Exercises. \" Current as of: November 29, 2017 Content Version: 11.7 © 8425-5513 Healthwise, Incorporated. Care instructions adapted under license by [employer] (which disclaims liability or warranty for this information). If you have questions about a medical condition or this instruction, always ask your healthcare professional. [address] any warranty or liability for your use of this information. Introducing [facility]! Dear Moira Evans: Thank you for requesting a Cheers account. Our records indicate that you already have an active Cheers account. You can access your account anytime at [email or web address]. [email or web address] Did you know that you can access your hospital and ER discharge instructions at any time in Cheers? You can also review all of your test results from your hospital stay or ER visit. Additional Information If you have questions, please visit the Frequently Asked Questions section of the Cheers website at [email or web address]/. Remember, Cheers is NOT to be used for urgent needs. For medical emergencies, dial 911. Now available from your iPhone and Android! Please provide this summary of care documentation to your next provider. Your primary care clinician is listed as Xu Last. If you have any questions after today's visit, please call [phone].

## 2018-08-20 NOTE — PROGRESS NOTES
[facility]  [address] [place], [facility] [address]  Phone: [phone]  Fax: [phone]    Pt's YOB: 1960    ASSESSMENT   Diagnoses and all orders for this visit:    1. Lumbar facet arthropathy (HCC)  -     KETOROLAC TROMETHAMINE INJ  -     ketorolac (TORADOL) 15 mg/mL soln injection; 4 mL by IntraMUSCular route once for 1 dose. -     THER/PROPH/DIAG INJECTION, SUBCUT/IM    2. Neuritis    3. Muscle spasm of back    4. Dizziness         IMPRESSION AND PLAN:  Adrian [place] is a 61 y.o. male with history of lumbar pain. Pt complains of pain in the right buttock/hip. He tried trazodone since his last office visit with improvement in sleep but notes that he did not sleep well this weekend. Pt notes that he is only taking Topamax 25 mg and ibuprofen 800 mg BID at this time. He reports that he started to feel \"whacked out\" when he discontinued the Neurontin and started Cymbalta in 04/2018.     1) Pt was given information on lumbar arthritis exercises. 2) He will taper off the Topamax 25 mg as directed. 3) Pt will discontinue the ibuprofen 800 mg as it is not effective. 4) I recommended the patient take Tylenol 500 mg 2 tabs BID-TID as needed. 5) Discussed restarting Neurontin if needed. 6) I recommended the patient try water exercise. 7) Pt received a Toradol injection in the office today. 8) Mr. Seferino Soto has a reminder for a \"due or due soon\" health maintenance. I have asked that he contact his primary care provider, Rhoda Cortes MD, for follow-up on this health maintenance. 9)  demonstrated consistency with prescribing. 10) Pt will follow-up in 3 weeks or sooner if needed. HISTORY OF PRESENT ILLNESS:  Adrian [place] is a 61 y.o. male with history of lumbar pain. Pt complains of pain in the right buttock/hip.  He tried trazodone since his last office visit with improvement in sleep but notes that he did not sleep well this weekend. Pt notes that he has not felt well rested all weekend. He has a CPAP machine at home but admits that he has not used it in a long time. Pt states that he tosses and turns throughout the night, experiences pain when laying down, and experiences difficulty finding a comfortable position. He experiences pain when laying on his right side. On Saturday, 08/18/2018, he fell and \"jammed\" his thumb. He admits to recent stress/anxiety and notes that he is almost \"financially broke\" since he is not working at this time. Pt was a  up until 11/2017 and notes that he had to stop working due to low back pain and difficulty filing out his paper work. He admits that his job was physically and mentally demanding. Pt notes that he is only taking Topamax 25 mg  and ibuprofen 800 mg BID at this time. He notes that when taking his medications he experienced drowsiness so he adjusted his Topamax 25 mg and now takes it 1 tab TID. Pt states that he started to feel \"whacked out\" when he discontinued the Neurontin and started Cymbalta in 04/2018. He notes that he has not felt like his normal self since 04/2018. Pt admits that he was more irritable when taking the Neurontin. He notes that he experiences ringing in his ear and will follow up with his neurologist and PCP today. Pt admits to pain when laying in bed. He tried taking Tylenol rapid release and is unsure if ibuprofen or the Tylenol is effective. Pt notes that he has experienced blurry vision over the last couple months. He is unsure if his blood sugars are well managed but he will get this checked when he visits his PCP, Dr. Edgar Contreras, today. Pt notes that he has a pool at home and experiences relief when he is in the pool. Pt at this time desires to proceed with medication evaluation. Of note, patient is a nonsmoker and denies any history of tobacco use. 30 minutes of face to face contact was spent with the patient during today's office visit. 20 minutes was spent counseling him about his current symptoms and medication use. Pain Scale: 8/10    PCP: Cherelle Harris MD     Past Medical History:   Diagnosis Date    Atrial fibrillation ([facility] [address]     Cardiac echocardiogram 09/05/2014    EF 55%. No WMA. Mild LVH. Gr 1 DDfx. RVSP 20 mmHg. No significant valvular heart disease. Unchanged from study of 11/23/10.  Cardiac Holter monitor study 09/24/2016    Sinus rhythm. Avg HR 71 bpm (range  bpm). One 4-beat run of SVT. No pause >2.1 secs.  Cardiac nuclear imaging test 03/15/2013    Mild, fixed inferobasal defect likely artifact. No WMA. EF 60%.  Essential hypertension, benign     Musculoskeletal disorder     ruptured disc    Obesity, unspecified     Other and unspecified hyperlipidemia     Other malaise and fatigue     Sleep apnea     cpap    Type II or unspecified type diabetes mellitus without mention of complication, not stated as uncontrolled     Unspecified sleep apnea         Social History     Social History    Marital status:      Spouse name: N/A    Number of children: N/A    Years of education: N/A     Occupational History    Not on file. Social History Main Topics    Smoking status: Former Smoker     Quit date: 8/25/1998    Smokeless tobacco: Former User    Alcohol use Yes      Comment: occasional    Drug use: Not on file    Sexual activity: Not on file     Other Topics Concern    Not on file     Social History Narrative       Current Outpatient Prescriptions   Medication Sig Dispense Refill    traZODone (DESYREL) 50 mg tablet Take 1 Tab by mouth nightly. Indications: insomnia associated with depression 7 Tab 0    topiramate (TOPAMAX) 25 mg tablet Take 3 Tabs by mouth two (2) times a day. 60 Tab 0    gabapentin (NEURONTIN) 300 mg capsule Take 1-2 caps po q am and 2 caps po q pm as directed. (Patient taking differently: Take 300 mg by mouth two (2) times a day.  Take 1-2 caps po q am and 2 caps po q pm as directed.) 120 Cap 2    metformin (GLUMETZA) 500 mg TG24 24 hour tablet Take 500 mg by mouth daily.  glimepiride (AMARYL) 4 mg tablet Take 4 mg by mouth every morning.  ibuprofen (MOTRIN) 800 mg tablet Take 800 mg by mouth two (2) times daily as needed. No Known Allergies      REVIEW OF SYSTEMS    Constitutional: Negative for fever, chills, or weight change. Respiratory: Negative for cough or shortness of breath. Cardiovascular: Negative for chest pain or palpitations. Gastrointestinal: Negative for acid reflux, change in bowel habits, or constipation. Genitourinary: Negative for dysuria and flank pain. Musculoskeletal: Positive for lumbar pain. Skin: Negative for rash. Neurological: Negative for headaches, dizziness, or numbness. Endo/Heme/Allergies: Negative for increased bruising. Psychiatric/Behavioral: Positive for difficulty with sleep. PHYSICAL EXAMINATION  Visit Vitals    /66    Pulse 78    Temp 98.2 °F (36.8 °C) (Oral)    Resp 16    Ht 6' (1.829 m)    Wt 300 lb (136.1 kg)    SpO2 98%    BMI 40.69 kg/m2       Constitutional: Awake, alert, and in no acute distress. Neurological: 1+ symmetrical DTRs in the upper extremities. 1+ symmetrical DTRs in the lower extremities. Sensation to light touch is intact. Negative Felicita's sign bilaterally. Skin: warm, dry, and intact. Musculoskeletal: Tenderness to palpation in the lower lumbar region. No pain with extension, axial loading, or forward flexion. No pain with internal or external rotation of his hips. Negative straight leg raise bilaterally. Patient ambulates with the assistance of a single point cane.       Biceps  Triceps Deltoids Wrist Ext Wrist Flex Hand Intrin   Right +4/5 +4/5 +4/5 +4/5 +4/5 +4/5   Left +4/5 +4/5 +4/5 +4/5 +4/5 +4/5      Hip Flex  Quads Hamstrings Ankle DF EHL Ankle PF   Right  4/5  4/5  4/5 +4/5 +4/5 +4/5   Left  4/5  4/5  4/5 +4/5 +4/5 +4/5 IMAGING:    Lumbar spine MRI from 07/31/2017 was personally reviewed with the patient and demonstrated:    Results from [place] encounter on 07/31/17   MRI LUMB SPINE W WO CONT   Narrative Sagittal and axial multisequence MR images of lumbar spine were obtained without  and with 13.5 cc Gadavist gadolinium IV contrast.    HISTORY: Chronic lower back pain and left leg pain. Previous laminectomy in  1990s    COMPARISON: MRI October 2, 2009    Trace retrolisthesis of L5 on S1, slightly more conspicuous than before. Significant loss of discal height with endplate irregularity at L4-L5, with  spondylosis. Mild discogenic endplate fatty changes and edema at L4-L5. No  compression fracture or pathologic marrow signal otherwise. Conus medullaris  ends at L1 with normal morphology and signal intensity. No abnormal enhancement. T12 and L1, L1-L2 and L2-L3: No disc herniation, central or foraminal stenosis. Maintained normal discal height and signal intensity. No disc desiccation. L3-L4: Mild left posterolateral disc protrusion. No central stenosis. Mild left  facet and ligamentous hypertrophy. No significant foraminal stenosis. No nerve  root compression. L4-L5: Left laminotomy. No significant distortion of the thecal sac. No  arachnoiditis. Mild posterior disc bulge with endplate spondylosis. No central  stenosis. Facet hypertrophy with mild bilateral foraminal narrowing but no  exiting nerve root compression. Slightly prominent anterior epidural fat with  mild indentation to the left anterior lateral thecal sac. L5-S1: Mild posterior disc bulge with left posterior lateral annular tear. No  central stenosis. Facet and ligamentous hypertrophy with moderate left foraminal  stenosis. Mild compression of left exiting L5 nerve root, but unchanged from  2009. Impression IMPRESSION:  1. Left L5 foraminal stenosis with mild compression of the left exiting L5 nerve  root, but unchanged from 2009. Left posterior lateral annular tear at L5-S1  which is more conspicuous than before. 2. Prominent anterior epidural fat at L4-L5, with mild indentation to the left  anterolateral thecal sac. Disc disease at L4-L5 with spondylolisthesis otherwise  stable. 3. Mild left posterolateral disc protrusion L3-L4 with no significant mass  effect. Written by Mima Schaefer, as dictated by Mj Villalobos MD.  I, Dr. Mj Villalobos confirm that all documentation is accurate.

## 2018-08-20 NOTE — PATIENT INSTRUCTIONS

## 2018-09-10 ENCOUNTER — OFFICE VISIT (OUTPATIENT)
Dept: ORTHOPEDIC SURGERY | Age: 58
End: 2018-09-10

## 2018-09-10 VITALS
BODY MASS INDEX: 41.04 KG/M2 | HEART RATE: 74 BPM | SYSTOLIC BLOOD PRESSURE: 132 MMHG | HEIGHT: 72 IN | WEIGHT: 303 LBS | DIASTOLIC BLOOD PRESSURE: 75 MMHG

## 2018-09-10 DIAGNOSIS — M25.551 RIGHT HIP PAIN: ICD-10-CM

## 2018-09-10 DIAGNOSIS — M79.2 NEURITIS: ICD-10-CM

## 2018-09-10 DIAGNOSIS — M47.816 LUMBAR FACET ARTHROPATHY: Primary | ICD-10-CM

## 2018-09-10 DIAGNOSIS — M62.830 MUSCLE SPASM OF BACK: ICD-10-CM

## 2018-09-10 RX ORDER — ACETAMINOPHEN 500 MG
TABLET ORAL
COMMUNITY

## 2018-09-10 RX ORDER — METHOCARBAMOL 500 MG/1
TABLET, FILM COATED ORAL
Qty: 60 TAB | Refills: 1 | Status: SHIPPED | OUTPATIENT
Start: 2018-09-10

## 2018-09-10 NOTE — MR AVS SNAPSHOT
[address] [place] [address] [address] [facility] [address] 
[phone] Patient: Annie Tai MRN: [id number] [facility]:27/54/[birth year]7 Visit Information Date & Time Provider Department Dept. Phone Encounter #  
 9/10/2018  7:45 AM Ira Gentile MD [facility] [phone] [id number] Follow-up Instructions Return in about 1 month (around 10/10/2018) for Medication follow up. Upcoming Health Maintenance Date Due Hepatitis C Screening 1960 HEMOGLOBIN A1C Q6M 1960 FOOT EXAM Q1 10/23/1969 MICROALBUMIN Q1 10/23/1969 EYE EXAM RETINAL OR DILATED Q1 10/23/1969 Pneumococcal 19-64 Medium Risk (1 of 1 - PPSV23) 10/23/1978 DTaP/Tdap/Td series (1 - Tdap) 10/23/1980 FOBT Q 1 YEAR AGE 50-75 10/23/2009 LIPID PANEL Q1 11/23/2011 Influenza Age 5 to Adult 8/1/2018 Allergies as of 9/10/2018  Review Complete On: 9/10/2018 By: Ira Gentile MD  
 No Known Allergies Current Immunizations  Never Reviewed No immunizations on file. Not reviewed this visit You Were Diagnosed With   
  
 Codes Comments Lumbar facet arthropathy (HCC)    -  Primary ICD-10-CM: M46.96 
ICD-9-CM: 721.3 Muscle spasm of back     ICD-10-CM: X31.550 ICD-9-CM: 724.8 Neuritis     ICD-10-CM: M79.2 ICD-9-CM: 729.2 Vitals BP Pulse Height(growth percentile) Weight(growth percentile) BMI Smoking Status 132/75 74 6' (1.829 m) 303 lb (137.4 kg) 41.09 kg/m2 Former Smoker Vitals History BMI and BSA Data Body Mass Index Body Surface Area 41.09 kg/m 2 2.64 m 2 Preferred Pharmacy Pharmacy Name Phone [id number] [place], [address] [facility] [facility] [phone] Your Updated Medication List  
  
   
This list is accurate as of 9/10/18  8:34 AM.  Always use your most recent med list.  
  
  
  
  
 gabapentin 300 mg capsule Commonly known as:  NEURONTIN Take 1-2 caps po q am and 2 caps po q pm as directed. glimepiride 4 mg tablet Commonly known as:  AMARYL Take 4 mg by mouth every morning. GLUMETZA 500 mg Tg24 24 hour tablet Generic drug:  metFORMIN Take 500 mg by mouth daily. ibuprofen 800 mg tablet Commonly known as:  MOTRIN Take 800 mg by mouth two (2) times daily as needed. methocarbamol 500 mg tablet Commonly known as:  ROBAXIN  
1 po bid -tid as needed for muscle spasm  Indications: Muscle Spasm  
  
 topiramate 25 mg tablet Commonly known as:  TOPAMAX Take 3 Tabs by mouth two (2) times a day. traZODone 50 mg tablet Commonly known as:  Hessie Block Take 1 Tab by mouth nightly. Indications: insomnia associated with depression TYLENOL EXTRA STRENGTH 500 mg tablet Generic drug:  acetaminophen Take  by mouth every six (6) hours as needed for Pain. Prescriptions Printed Refills  
 methocarbamol (ROBAXIN) 500 mg tablet 1 Si po bid -tid as needed for muscle spasm  Indications: Muscle Spasm Class: Print Follow-up Instructions Return in about 1 month (around 10/10/2018) for Medication follow up. Patient Instructions Low Back Arthritis: Exercises Your Care Instructions Here are some examples of typical rehabilitation exercises for your condition. Start each exercise slowly. Ease off the exercise if you start to have pain. Your doctor or physical therapist will tell you when you can start these exercises and which ones will work best for you. When you are not being active, find a comfortable position for rest. Some people are comfortable on the floor or a medium-firm bed with a small pillow under their head and another under their knees. Some people prefer to lie on their side with a pillow between their knees. Don't stay in one position for too long. Take short walks (10 to 20 minutes) every 2 to 3 hours.  Avoid slopes, hills, and stairs until you feel better. Walk only distances you can manage without pain, especially leg pain. How to do the exercises Pelvic tilt 1. Lie on your back with your knees bent. 2. \"Brace\" your stomach-tighten your muscles by pulling in and imagining your belly button moving toward your spine. 3. Press your lower back into the floor. You should feel your hips and pelvis rock back. 4. Hold for 6 seconds while breathing smoothly. 5. Relax and allow your pelvis and hips to rock forward. 6. Repeat 8 to 12 times. Back stretches 1. Get down on your hands and knees on the floor. 2. Relax your head and allow it to droop. Round your back up toward the ceiling until you feel a nice stretch in your upper, middle, and lower back. Hold this stretch for as long as it feels comfortable, or about 15 to 30 seconds. 3. Return to the starting position with a flat back while you are on your hands and knees. 4. Let your back sway by pressing your stomach toward the floor. Lift your buttocks toward the ceiling. 5. Hold this position for 15 to 30 seconds. 6. Repeat 2 to 4 times. Follow-up care is a key part of your treatment and safety. Be sure to make and go to all appointments, and call your doctor if you are having problems. It's also a good idea to know your test results and keep a list of the medicines you take. Where can you learn more? Go to http://jimbo-faith.info/. Enter L424 in the search box to learn more about \"Low Back Arthritis: Exercises. \" Current as of: November 29, 2017 Content Version: 11.7 © 9279-7346 Healthwise, Incorporated. Care instructions adapted under license by [employer] (which disclaims liability or warranty for this information). If you have questions about a medical condition or this instruction, always ask your healthcare professional. [address] any warranty or liability for your use of this information. Introducing \[facility]\"" & HEALTH SERVICES! Dear Pablito Cosme: Thank you for requesting a Foxwordy account. Our records indicate that you already have an active Foxwordy account. You can access your account anytime at [email or web address] Did you know that you can access your hospital and ER discharge instructions at any time in Foxwordy? You can also review all of your test results from your hospital stay or ER visit. Additional Information If you have questions, please visit the Frequently Asked Questions section of the Foxwordy website at [email or web address] Remember, Foxwordy is NOT to be used for urgent needs. For medical emergencies, dial 911. Now available from your iPhone and Android! Please provide this summary of care documentation to your next provider. Your primary care clinician is listed as Luis Fernando Antunez. If you have any questions after today's visit, please call [phone].

## 2018-09-10 NOTE — PROGRESS NOTES
Danwa Francis [facility] 2. 
[address] Phone: [phone] Fax: [phone] Pt's YOB: 1960 ASSESSMENT Diagnoses and all orders for this visit: 1. Lumbar facet arthropathy ([address] 2. Muscle spasm of back 
-     methocarbamol (ROBAXIN) 500 mg tablet; 1 po bid -tid as needed for muscle spasm  Indications: Muscle Spasm 3. Neuritis 4. Right hip pain IMPRESSION AND PLAN: 
Melly Solis is a 61 y.o. male with history of lumbar pain. He complains of lower back pain with numbness, tingling, and burning pain in the left leg. Pt tapered off the Topamax since his last office visit and now only takes Tylenol. He notes that he experienced brain fog and \"felt like a zombie\" when taking Cymbalta, trazodone, Neurontin, and Topamax in the past.  
 
1) Pt was given information on lumbar exercises. 2) I recommend the patient restart ibuprofen 800 mg. He may alternate this with Tylenol if needed. Pt was instructed to drink plenty of water while taking the ibuprofen. 3) Pt was prescribed Robaxin 500 mg 1 tab BID-TID prn muscle spasms. 4) He may restart his previously prescribed Neurontin 300 mg 2 tabs QHS, tapering up as directed. 5) Mr. Tri Sher has a reminder for a \"due or due soon\" health maintenance. I have asked that he contact his primary care provider, Luis Fernando Antunez MD, for follow-up on this health maintenance. 6)  demonstrated consistency with prescribing. 7) Pt will follow-up in 1 month or sooner if needed. HISTORY OF PRESENT ILLNESS: 
Melly Solis is a 61 y.o. male with history of lumbar pain. He notes that he experiences difficulty sitting, standing, or any activity for prolonged periods of time due to lower back pain. Pt reports difficulty transitioning from sit to stand after sitting in his car for prolonged periods of time.  He states that he experienced severe pain when he recently mowed his yard. Pt experiences numbness, tingling, and burning pain in the left leg. particularly at night. Pt states that he generally sleeps on his sides but since he has been experiencing right hip pain he has recently been sleeping on his back. Pt notes that since laying in his back he has been experiencing increased pain in the left leg. He states that he has noticed increased right shoulder pain since he frequently tosses and turns while in bed. Pt admits that he has also been in his pool a lot and is unsure if his shoulder pain could be related to this activity. Of note. he had prior lumbar surgery with Dr. Shreya Ho in [address] [place] [address]. He tried a Toradol injection at his last office visit with temporary improvement. Pt states that he has tapered off Topamax (which he was on for 6 months total) and now only takes Tylenol. 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 Pt notes that his tinnitus has not improved since he discontinued the ibuprofen but admits to significant relief while taking the ibuprofen. Pt denies any history of renal issues. He is diabetic and notes that his blood sugars generally remain around 130-180. Pt notes that he experienced brain fog and \"felt like a zombie\" when taking Cymbalta. He states that he was also taking trazodone, Neurontin, and Topamax at the time he was taking Cymbalta so he is unsure if the side effects were from another medication. He used to take Neurontin 300 mg 1 tab QAM and 1 tab QHS with benefit. Pt denies any suicidal ideation and has also talked with Dr. Yamilka Braga about his emotional state and handling his stress . Pt desires to proceed with medication evaluation. More than 25 minutes was spent discussing pt's pain, response to medication, work up for tinnitus, use of ibuprofen, gabapentin and muscle relaxers. Pain Scale: 6/10 PCP: Gabby Pugh MD 
  
Past Medical History:  
Diagnosis Date  Atrial fibrillation ([facility] [address]  Cardiac echocardiogram 09/05/2014 EF 55%. No WMA. Mild LVH. Gr 1 DDfx. RVSP 20 mmHg. No significant valvular heart disease. Unchanged from study of 11/23/10.  Cardiac Holter monitor study 09/24/2016 Sinus rhythm. Avg HR 71 bpm (range  bpm). One 4-beat run of SVT. No pause >2.1 secs.  Cardiac nuclear imaging test 03/15/2013 Mild, fixed inferobasal defect likely artifact. No WMA. EF 60%.  Essential hypertension, benign  Musculoskeletal disorder   
 ruptured disc  Obesity, unspecified  Other and unspecified hyperlipidemia  Other malaise and fatigue  Sleep apnea   
 cpap  Type II or unspecified type diabetes mellitus without mention of complication, not stated as uncontrolled  Unspecified sleep apnea Social History Social History  Marital status:  Spouse name: N/A  
 Number of children: N/A  
 Years of education: N/A Occupational History  Not on file. Social History Main Topics  Smoking status: Former Smoker Quit date: 8/25/1998  Smokeless tobacco: Former User  Alcohol use Yes Comment: occasional  
 Drug use: Not on file  Sexual activity: Not on file Other Topics Concern  Not on file Social History Narrative Current Outpatient Prescriptions Medication Sig Dispense Refill  acetaminophen (TYLENOL EXTRA STRENGTH) 500 mg tablet Take  by mouth every six (6) hours as needed for Pain.  methocarbamol (ROBAXIN) 500 mg tablet 1 po bid -tid as needed for muscle spasm  Indications: Muscle Spasm 60 Tab 1  
 metformin (GLUMETZA) 500 mg TG24 24 hour tablet Take 500 mg by mouth daily.  glimepiride (AMARYL) 4 mg tablet Take 4 mg by mouth every morning.  traZODone (DESYREL) 50 mg tablet Take 1 Tab by mouth nightly. Indications: insomnia associated with depression (Patient not taking: Reported on 9/10/2018) 7 Tab 0  
 topiramate (TOPAMAX) 25 mg tablet Take 3 Tabs by mouth two (2) times a day. (Patient not taking: Reported on 9/10/2018) 60 Tab 0  
 gabapentin (NEURONTIN) 300 mg capsule Take 1-2 caps po q am and 2 caps po q pm as directed. (Patient not taking: Reported on 9/10/2018) 120 Cap 2  ibuprofen (MOTRIN) 800 mg tablet Take 800 mg by mouth two (2) times daily as needed. No Known Allergies REVIEW OF SYSTEMS Constitutional: Negative for fever, chills, or weight change. Respiratory: Negative for cough or shortness of breath. Cardiovascular: Negative for chest pain or palpitations. Gastrointestinal: Negative for acid reflux, change in bowel habits, or constipation. Genitourinary: Negative for dysuria and flank pain. Musculoskeletal: Positive for lumbar and hip pain. Skin: Negative for rash. Neurological: Negative for headaches, dizziness, or numbness. Endo/Heme/Allergies: Negative for increased bruising. Psychiatric/Behavioral: Negative for difficulty with sleep. PHYSICAL EXAMINATION Visit Vitals  /75  Pulse 74  Ht 6' (1.829 m)  Wt 303 lb (137.4 kg)  BMI 41.09 kg/m2 Constitutional: Awake, alert, and in no acute distress. Neurological: 1+ symmetrical DTRs in the lower extremities. Sensation to light touch is intact. Skin: warm, dry, and intact. Musculoskeletal: Tenderness to palpation in the lower lumbar region. Pain with extension and axial loading. Improved with forward flexion. Moderate pain with internal rotation and flexion of his right hip; no pain on the left. Negative straight leg raise bilaterally. Hip Flex  Quads Hamstrings Ankle DF EHL Ankle PF Right +4/5 +4/5 +4/5 +4/5 +4/5 +4/5 Left +4/5 +4/5 +4/5 +4/5 +4/5 +4/5 IMAGING: 
 
Lumbar spine MRI from 07/31/2017 was personally reviewed with the patient and demonstrated:  
 Results from Hospital Encounter on 07/31/17 MRI LUMB SPINE W WO CONT 
  Narrative Sagittal and axial multisequence MR images of lumbar spine were obtained without 
and with 13.5 cc Gadavist gadolinium IV contrast. 
 
HISTORY: Chronic lower back pain and left leg pain. Previous laminectomy in 
1990s COMPARISON: MRI October 2, 2009 Trace retrolisthesis of L5 on S1, slightly more conspicuous than before. Significant loss of discal height with endplate irregularity at L4-L5, with 
spondylosis. Mild discogenic endplate fatty changes and edema at L4-L5. No 
compression fracture or pathologic marrow signal otherwise. Conus medullaris 
ends at L1 with normal morphology and signal intensity. No abnormal enhancement. T12 and L1, L1-L2 and L2-L3: No disc herniation, central or foraminal stenosis. Maintained normal discal height and signal intensity. No disc desiccation. L3-L4: Mild left posterolateral disc protrusion. No central stenosis. Mild left 
facet and ligamentous hypertrophy. No significant foraminal stenosis. No nerve 
root compression. L4-L5: Left laminotomy. No significant distortion of the thecal sac. No 
arachnoiditis. Mild posterior disc bulge with endplate spondylosis. No central 
stenosis. Facet hypertrophy with mild bilateral foraminal narrowing but no 
exiting nerve root compression. Slightly prominent anterior epidural fat with 
mild indentation to the left anterior lateral thecal sac. L5-S1: Mild posterior disc bulge with left posterior lateral annular tear. No 
central stenosis. Facet and ligamentous hypertrophy with moderate left foraminal 
stenosis. Mild compression of left exiting L5 nerve root, but unchanged from 
2009.     
  
  Impression IMPRESSION: 
1. Left L5 foraminal stenosis with mild compression of the left exiting L5 nerve 
root, but unchanged from 2009. Left posterior lateral annular tear at L5-S1 
which is more conspicuous than before. 2. Prominent anterior epidural fat at L4-L5, with mild indentation to the left 
anterolateral thecal sac. Disc disease at L4-L5 with spondylolisthesis otherwise 
stable. 3. Mild left posterolateral disc protrusion L3-L4 with no significant mass 
effect. Written by Gerardo Diop, as dictated by Ilan Barahona MD. 
I, Dr. Ilan Barahona confirm that all documentation is accurate.

## 2018-09-10 NOTE — PATIENT INSTRUCTIONS
Low Back Arthritis: Exercises Your Care Instructions Here are some examples of typical rehabilitation exercises for your condition. Start each exercise slowly. Ease off the exercise if you start to have pain. Your doctor or physical therapist will tell you when you can start these exercises and which ones will work best for you. When you are not being active, find a comfortable position for rest. Some people are comfortable on the floor or a medium-firm bed with a small pillow under their head and another under their knees. Some people prefer to lie on their side with a pillow between their knees. Don't stay in one position for too long. Take short walks (10 to 20 minutes) every 2 to 3 hours. Avoid slopes, hills, and stairs until you feel better. Walk only distances you can manage without pain, especially leg pain. How to do the exercises Pelvic tilt 1. Lie on your back with your knees bent. 2. \"Brace\" your stomach-tighten your muscles by pulling in and imagining your belly button moving toward your spine. 3. Press your lower back into the floor. You should feel your hips and pelvis rock back. 4. Hold for 6 seconds while breathing smoothly. 5. Relax and allow your pelvis and hips to rock forward. 6. Repeat 8 to 12 times. Back stretches 1. Get down on your hands and knees on the floor. 2. Relax your head and allow it to droop. Round your back up toward the ceiling until you feel a nice stretch in your upper, middle, and lower back. Hold this stretch for as long as it feels comfortable, or about 15 to 30 seconds. 3. Return to the starting position with a flat back while you are on your hands and knees. 4. Let your back sway by pressing your stomach toward the floor. Lift your buttocks toward the ceiling. 5. Hold this position for 15 to 30 seconds. 6. Repeat 2 to 4 times. Follow-up care is a key part of your treatment and safety.  Be sure to make and go to all appointments, and call your doctor if you are having problems. It's also a good idea to know your test results and keep a list of the medicines you take. Where can you learn more? Go to http://jimbo-faith.info/. Enter R499 in the search box to learn more about \"Low Back Arthritis: Exercises. \" Current as of: November 29, 2017 Content Version: 11.7 © 8834-3765 [employer]. Care instructions adapted under license by [employer] (which disclaims liability or warranty for this information). If you have questions about a medical condition or this instruction, always ask your healthcare professional. [address] any warranty or liability for your use of this information.

## 2019-05-22 ENCOUNTER — APPOINTMENT (OUTPATIENT)
Dept: GENERAL RADIOLOGY | Age: 59
End: 2019-05-22
Attending: PHYSICIAN ASSISTANT
Payer: COMMERCIAL

## 2019-05-22 ENCOUNTER — HOSPITAL ENCOUNTER (EMERGENCY)
Age: 59
Discharge: HOME | End: 2019-05-22
Attending: EMERGENCY MEDICINE
Payer: COMMERCIAL

## 2019-05-22 VITALS
BODY MASS INDEX: 38.6 KG/M2 | DIASTOLIC BLOOD PRESSURE: 82 MMHG | WEIGHT: 285 LBS | HEIGHT: 72 IN | HEART RATE: 85 BPM | SYSTOLIC BLOOD PRESSURE: 145 MMHG | OXYGEN SATURATION: 100 % | TEMPERATURE: 98.2 F | RESPIRATION RATE: 16 BRPM

## 2019-05-22 DIAGNOSIS — M79.645 PAIN IN LEFT FINGER(S): ICD-10-CM

## 2019-05-22 DIAGNOSIS — S69.92XA FINGER INJURY, LEFT, INITIAL ENCOUNTER: ICD-10-CM

## 2019-05-22 DIAGNOSIS — S61.209A: Primary | ICD-10-CM

## 2019-05-22 PROCEDURE — 74011000250 HC RX REV CODE- 250: Performed by: EMERGENCY MEDICINE

## 2019-05-22 PROCEDURE — 90471 IMMUNIZATION ADMIN: CPT

## 2019-05-22 PROCEDURE — 99283 EMERGENCY DEPT VISIT LOW MDM: CPT

## 2019-05-22 PROCEDURE — 73130 X-RAY EXAM OF HAND: CPT

## 2019-05-22 PROCEDURE — 96372 THER/PROPH/DIAG INJ SC/IM: CPT

## 2019-05-22 PROCEDURE — 90715 TDAP VACCINE 7 YRS/> IM: CPT | Performed by: PHYSICIAN ASSISTANT

## 2019-05-22 PROCEDURE — 74011250636 HC RX REV CODE- 250/636: Performed by: PHYSICIAN ASSISTANT

## 2019-05-22 PROCEDURE — 74011250637 HC RX REV CODE- 250/637: Performed by: PHYSICIAN ASSISTANT

## 2019-05-22 RX ORDER — LIDOCAINE HYDROCHLORIDE 10 MG/ML
5 INJECTION, SOLUTION EPIDURAL; INFILTRATION; INTRACAUDAL; PERINEURAL ONCE
Status: COMPLETED | OUTPATIENT
Start: 2019-05-22 | End: 2019-05-22

## 2019-05-22 RX ORDER — OXYCODONE AND ACETAMINOPHEN 5; 325 MG/1; MG/1
1 TABLET ORAL
Qty: 12 TAB | Refills: 0 | Status: SHIPPED | OUTPATIENT
Start: 2019-05-22 | End: 2019-05-25

## 2019-05-22 RX ORDER — BUPIVACAINE HYDROCHLORIDE 5 MG/ML
5 INJECTION, SOLUTION EPIDURAL; INTRACAUDAL ONCE
Status: COMPLETED | OUTPATIENT
Start: 2019-05-22 | End: 2019-05-22

## 2019-05-22 RX ORDER — MORPHINE SULFATE 4 MG/ML
4 INJECTION INTRAVENOUS
Status: DISCONTINUED | OUTPATIENT
Start: 2019-05-22 | End: 2019-05-22

## 2019-05-22 RX ORDER — OXYCODONE AND ACETAMINOPHEN 5; 325 MG/1; MG/1
2 TABLET ORAL
Status: COMPLETED | OUTPATIENT
Start: 2019-05-22 | End: 2019-05-22

## 2019-05-22 RX ORDER — CEPHALEXIN 500 MG/1
500 CAPSULE ORAL 4 TIMES DAILY
Qty: 28 CAP | Refills: 0 | Status: SHIPPED | OUTPATIENT
Start: 2019-05-22 | End: 2019-05-29

## 2019-05-22 RX ORDER — MORPHINE SULFATE 4 MG/ML
4 INJECTION INTRAVENOUS ONCE
Status: COMPLETED | OUTPATIENT
Start: 2019-05-22 | End: 2019-05-22

## 2019-05-22 RX ADMIN — TETANUS TOXOID, REDUCED DIPHTHERIA TOXOID AND ACELLULAR PERTUSSIS VACCINE, ADSORBED 0.5 ML: 5; 2.5; 8; 8; 2.5 SUSPENSION INTRAMUSCULAR at 19:30

## 2019-05-22 RX ADMIN — LIDOCAINE HYDROCHLORIDE 0.5 ML: 10 INJECTION, SOLUTION EPIDURAL; INFILTRATION; INTRACAUDAL at 18:02

## 2019-05-22 RX ADMIN — MORPHINE SULFATE 4 MG: 4 INJECTION INTRAVENOUS at 18:30

## 2019-05-22 RX ADMIN — BUPIVACAINE HYDROCHLORIDE 25 MG: 5 INJECTION, SOLUTION EPIDURAL; INTRACAUDAL; PERINEURAL at 19:00

## 2019-05-22 RX ADMIN — OXYCODONE HYDROCHLORIDE AND ACETAMINOPHEN 2 TABLET: 5; 325 TABLET ORAL at 18:02

## 2019-05-22 NOTE — DISCHARGE INSTRUCTIONS
Patient Education        Hand Pain: Care Instructions  Your Care Instructions    Common causes of hand pain are overuse and injuries, such as might happen during sports or home repair projects. Everyday wear and tear, especially as you get older, also can cause hand pain. Most minor hand injuries will heal on their own, and home treatment is usually all you need to do. If you have sudden and severe pain, you may need tests and treatment. Follow-up care is a key part of your treatment and safety. Be sure to make and go to all appointments, and call your doctor if you are having problems. It's also a good idea to know your test results and keep a list of the medicines you take. How can you care for yourself at home? · Take pain medicines exactly as directed. ? If the doctor gave you a prescription medicine for pain, take it as prescribed. ? If you are not taking a prescription pain medicine, ask your doctor if you can take an over-the-counter medicine. · Rest and protect your hand. Take a break from any activity that may cause pain. · Put ice or a cold pack on your hand for 10 to 20 minutes at a time. Put a thin cloth between the ice and your skin. · Prop up the sore hand on a pillow when you ice it or anytime you sit or lie down during the next 3 days. Try to keep it above the level of your heart. This will help reduce swelling. · If your doctor recommends a sling, splint, or elastic bandage to support your hand, wear it as directed. When should you call for help? Call 911 anytime you think you may need emergency care. For example, call if:    · Your hand turns cool or pale or changes color.    Call your doctor now or seek immediate medical care if:    · You cannot move your hand.     · Your hand pops, moves out of its normal position, and then returns to its normal position.     · You have signs of infection, such as:  ? Increased pain, swelling, warmth, or redness.   ? Red streaks leading from the sore area.  ? Pus draining from a place on your hand. ? A fever.     · Your hand feels numb or tingly.    Watch closely for changes in your health, and be sure to contact your doctor if:    · Your hand feels unstable when you try to use it.     · You do not get better as expected.     · You have any new symptoms, such as swelling.     · Bruises from an injury to your hand last longer than 2 weeks. Where can you learn more? Go to http://jimbo-faith.info/. Enter R273 in the search box to learn more about \"Hand Pain: Care Instructions. \"  Current as of: September 23, 2018  Content Version: 11.9  © 3107-0870 [employer]. Care instructions adapted under license by [employer] (which disclaims liability or warranty for this information). If you have questions about a medical condition or this instruction, always ask your healthcare professional. [place] [address] any warranty or liability for your use of this information. Patient Education        Puncture Wounds: Care Instructions  Your Care Instructions    A puncture wound can happen anywhere on your body. These wounds tend to be narrower and deeper than cuts. A puncture wound is usually left open instead of being closed. This is because a puncture wound can be easily infected, and closing it can make infection even more likely. You will probably have a bandage over the wound. The doctor has checked you carefully, but problems can develop later. If you notice any problems or new symptoms, get medical treatment right away. Follow-up care is a key part of your treatment and safety. Be sure to make and go to all appointments, and call your doctor if you are having problems. It's also a good idea to know your test results and keep a list of the medicines you take. How can you care for yourself at home? · Keep the wound dry for the first 24 to 48 hours.  After this, you can shower if your doctor okays it. Sherif Wallace the wound dry. · Don't soak the wound, such as in a bathtub. Your doctor will tell you when it's safe to get the wound wet. · If your doctor told you how to care for your wound, follow your doctor's instructions. If you did not get instructions, follow this general advice:  ? After the first 24 to 48 hours, wash the wound with clean water 2 times a day. Don't use hydrogen peroxide or alcohol, which can slow healing. ? You may cover the wound with a thin layer of petroleum jelly, such as Vaseline, and a nonstick bandage. ? Apply more petroleum jelly and replace the bandage as needed. · Prop up the sore area on pillows anytime you sit or lie down during the next 3 days. Try to keep it above the level of your heart. This helps reduce swelling. · Avoid any activity that could cause your wound to get worse. · Be safe with medicines. Read and follow all instructions on the label. ? If the doctor gave you a prescription medicine for pain, take it as prescribed. ? If you are not taking a prescription pain medicine, ask your doctor if you can take an over-the-counter medicine. · If your doctor prescribed antibiotics, take them as directed. Do not stop taking them just because you feel better. You need to take the full course of antibiotics. When should you call for help? Call your doctor now or seek immediate medical care if:    · You have new pain, or your pain gets worse.     · The wound starts to bleed, and blood soaks through the bandage. Oozing small amounts of blood is normal.     · The skin near the wound is cold or pale or changes color.     · You have tingling, weakness, or numbness near the wound.     · You have trouble moving the area near the wound.     · You have symptoms of infection, such as:  ? Increased pain, swelling, warmth, or redness around the wound. ? Red streaks leading from the wound. ? Pus draining from the wound.   ? A fever.    Watch closely for changes in your health, and be sure to contact your doctor if:    · The wound is not closing (getting smaller).     · You do not get better as expected. Where can you learn more? Go to http://jimbo-faith.info/. Enter F794 in the search box to learn more about \"Puncture Wounds: Care Instructions. \"  Current as of: September 23, 2018  Content Version: 11.9  © 5400-8736 [employer]. Care instructions adapted under license by [employer] (which disclaims liability or warranty for this information). If you have questions about a medical condition or this instruction, always ask your healthcare professional. [place] [address] any warranty or liability for your use of this information.

## 2019-05-22 NOTE — ED TRIAGE NOTES
Pt presents with nail through 2nd and 3rd finger of left hand from nail gun. Pt states last tetanus shot has been about 5 years ago.

## 2019-05-22 NOTE — ED NOTES
Adele Falcon is a 60 y.o. male that was discharged in stable. Pt was accompanied by spouse. Pt is not driving. The patients diagnosis, condition and treatment were explained to  patient and aftercare instructions were given. The patient verbalized understanding. Patient armband removed and shredded.

## 2019-05-22 NOTE — ED PROVIDER NOTES
EMERGENCY DEPARTMENT HISTORY AND PHYSICAL EXAM    Date: 5/22/2019  Patient Name: Chai Massey    History of Presenting Illness     Chief Complaint   Patient presents with    Finger Pain         History Provided By: Patient     Chief Complaint: puncture wound to left 2-3 rd digit   Duration: PTA   Timing:  Acute   Location: see above   Quality: throbbing   Severity: 5/10   Modifying Factors: using a nail gun makes it worse    Associated Symptoms: none       Additional History (Context): Chai Massey is a 60 y.o. male with a history of obesity and DOLORES who presents today for nail puncture wound through 2-3rd left digits. Reports unknown last tetanus. Reports he has not had anything for this. PCP: Kalia Kirby MD    Current Facility-Administered Medications   Medication Dose Route Frequency Provider Last Rate Last Dose    bupivacaine (PF) (MARCAINE) 0.5 % (5 mg/mL) injection 25 mg  5 mL SubCUTAneous ONCE Tere Carbone, Daniel Sharma(AC),Tet Vac-PF (BOOSTRIX) suspension 0.5 mL  0.5 mL IntraMUSCular PRIOR TO DISCHARGE Bobby Vela, [address]         Current Outpatient Medications   Medication Sig Dispense Refill    oxyCODONE-acetaminophen (PERCOCET) 5-325 mg per tablet Take 1 Tab by mouth every six (6) hours as needed for Pain for up to 3 days. Max Daily Amount: 4 Tabs. 12 Tab 0    cephALEXin (KEFLEX) 500 mg capsule Take 1 Cap by mouth four (4) times daily for 7 days. 28 Cap 0    acetaminophen (TYLENOL EXTRA STRENGTH) 500 mg tablet Take  by mouth every six (6) hours as needed for Pain.  methocarbamol (ROBAXIN) 500 mg tablet 1 po bid -tid as needed for muscle spasm  Indications: Muscle Spasm 60 Tab 1    traZODone (DESYREL) 50 mg tablet Take 1 Tab by mouth nightly. Indications: insomnia associated with depression (Patient not taking: Reported on 9/10/2018) 7 Tab 0    topiramate (TOPAMAX) 25 mg tablet Take 3 Tabs by mouth two (2) times a day.  (Patient not taking: Reported on 9/10/2018) 60 Tab 0    gabapentin (NEURONTIN) 300 mg capsule Take 1-2 caps po q am and 2 caps po q pm as directed. (Patient not taking: Reported on 9/10/2018) 120 Cap 2    metformin (GLUMETZA) 500 mg TG24 24 hour tablet Take 500 mg by mouth daily.  glimepiride (AMARYL) 4 mg tablet Take 4 mg by mouth every morning.  ibuprofen (MOTRIN) 800 mg tablet Take 800 mg by mouth two (2) times daily as needed. Past History     Past Medical History:  Past Medical History:   Diagnosis Date    Atrial fibrillation ([address]     Cardiac echocardiogram 2014    EF 55%. No WMA. Mild LVH. Gr 1 DDfx. RVSP 20 mmHg. No significant valvular heart disease. Unchanged from study of 11/23/10.  Cardiac Holter monitor study 2016    Sinus rhythm. Avg HR 71 bpm (range  bpm). One 4-beat run of SVT. No pause >2.1 secs.  Cardiac nuclear imaging test 03/15/2013    Mild, fixed inferobasal defect likely artifact. No WMA. EF 60%.  Essential hypertension, benign     Musculoskeletal disorder     ruptured disc    Obesity, unspecified     Other and unspecified hyperlipidemia     Other malaise and fatigue     Sleep apnea     cpap    Type II or unspecified type diabetes mellitus without mention of complication, not stated as uncontrolled     Unspecified sleep apnea        Past Surgical History:  Past Surgical History:   Procedure Laterality Date    HX ORTHOPAEDIC      \"back surgery\"    HX OTHER SURGICAL      inc and drainage perirectal abscess       Family History:  History reviewed. No pertinent family history.     Social History:  Social History     Tobacco Use    Smoking status: Former Smoker     Last attempt to quit: 1998     Years since quittin.7    Smokeless tobacco: Former User   Substance Use Topics    Alcohol use: Yes     Comment: occasional    Drug use: Not on file       Allergies:  No Known Allergies      Review of Systems   Review of Systems   Constitutional: Negative for chills and fever.   HENT: Negative for congestion, rhinorrhea and sore throat. Respiratory: Negative for cough and shortness of breath. Cardiovascular: Negative for chest pain. Gastrointestinal: Negative for abdominal pain, blood in stool, constipation, diarrhea, nausea and vomiting. Genitourinary: Negative for dysuria, frequency and hematuria. Musculoskeletal: Negative for back pain and myalgias. Skin: Positive for wound. Negative for rash. Neurological: Negative for dizziness and headaches. All other systems reviewed and are negative. All Other Systems Negative  Physical Exam     Vitals:    05/22/19 1714   BP: (!) 157/91   Pulse: 88   Resp: 18   Temp: 98.2 °F (36.8 °C)   SpO2: 98%   Weight: 129.3 kg (285 lb)   Height: 6' (1.829 m)     Physical Exam   Constitutional: He is oriented to person, place, and time. He appears well-developed and well-nourished. No distress. HENT:   Head: Normocephalic and atraumatic. Eyes: Conjunctivae are normal.   Neck: Normal range of motion. Neck supple. Cardiovascular: Normal rate, regular rhythm and normal heart sounds. Pulmonary/Chest: Effort normal and breath sounds normal. No respiratory distress. He exhibits no tenderness. Abdominal: Soft. Bowel sounds are normal. He exhibits no distension. There is no tenderness. There is no rebound and no guarding. Musculoskeletal: Normal range of motion. He exhibits no edema or deformity. Hands:  Patient is neurovascularly intact with strong radial pulses. Cap refill is less than 3 seconds   Neurological: He is alert and oriented to person, place, and time. Skin: Skin is warm and dry. He is not diaphoretic. Psychiatric: He has a normal mood and affect. Nursing note and vitals reviewed. Diagnostic Study Results     Labs -   No results found for this or any previous visit (from the past 12 hour(s)).     Radiologic Studies -   XR HAND LT MIN 3 V   Final Result      Radiopaque foreign body through the soft tissues of the second and third digit   palmar aspect at the level of the distal phalanges. CT Results  (Last 48 hours)    None        CXR Results  (Last 48 hours)    None            Medical Decision Making   I am the first provider for this patient. I reviewed the vital signs, available nursing notes, past medical history, past surgical history, family history and social history. Vital Signs-Reviewed the patient's vital signs. Records Reviewed: Nursing Notes and Old Medical Records     Procedures: None   Procedures    Provider Notes (Medical Decision Making):     Differential: Fracture, dislocation, laceration, puncture wound    Plan: Will order x-ray to ensure no bone involvement. Will remove foreign body    7:26 PM  Nail has been removed completely. Will place on prophylactic antibiotics. Will update tetanus. Will discharge home with pain medicine. At home wound precautions have been discussed. Advised patient to return in 2 days as needed for wound check as needed. Patient agrees with the plan and management and states all questions have been thoroughly answered and there are no more remaining questions. MED RECONCILIATION:  Current Facility-Administered Medications   Medication Dose Route Frequency    bupivacaine (PF) (MARCAINE) 0.5 % (5 mg/mL) injection 25 mg  5 mL SubCUTAneous ONCE    diph,Pertuss(AC),Tet Vac-PF (BOOSTRIX) suspension 0.5 mL  0.5 mL IntraMUSCular PRIOR TO DISCHARGE     Current Outpatient Medications   Medication Sig    oxyCODONE-acetaminophen (PERCOCET) 5-325 mg per tablet Take 1 Tab by mouth every six (6) hours as needed for Pain for up to 3 days. Max Daily Amount: 4 Tabs.  cephALEXin (KEFLEX) 500 mg capsule Take 1 Cap by mouth four (4) times daily for 7 days.  acetaminophen (TYLENOL EXTRA STRENGTH) 500 mg tablet Take  by mouth every six (6) hours as needed for Pain.     methocarbamol (ROBAXIN) 500 mg tablet 1 po bid -tid as needed for muscle spasm  Indications: Muscle Spasm    traZODone (DESYREL) 50 mg tablet Take 1 Tab by mouth nightly. Indications: insomnia associated with depression (Patient not taking: Reported on 9/10/2018)    topiramate (TOPAMAX) 25 mg tablet Take 3 Tabs by mouth two (2) times a day. (Patient not taking: Reported on 9/10/2018)    gabapentin (NEURONTIN) 300 mg capsule Take 1-2 caps po q am and 2 caps po q pm as directed. (Patient not taking: Reported on 9/10/2018)    metformin (GLUMETZA) 500 mg TG24 24 hour tablet Take 500 mg by mouth daily.  glimepiride (AMARYL) 4 mg tablet Take 4 mg by mouth every morning.  ibuprofen (MOTRIN) 800 mg tablet Take 800 mg by mouth two (2) times daily as needed. Disposition:  Home     DISCHARGE NOTE:   Pt has been reexamined. Patient has no new complaints, changes, or physical findings. Care plan outlined and precautions discussed. Results of workup were reviewed with the patient. All medications were reviewed with the patient. All of pt's questions and concerns were addressed. Patient was instructed and agrees to follow up with PCP  as well as to return to the ED upon further deterioration. Patient is ready to go home. Follow-up Information     Follow up With Specialties Details Why Contact [facility] EMERGENCY DEPT Emergency Medicine In 2 days For wound re-check [address] [id number] [phone]    Ricci Miller MD General Practice In 2 days  [facility] [address] [phone]            Current Discharge Medication List      START taking these medications    Details   oxyCODONE-acetaminophen (PERCOCET) 5-325 mg per tablet Take 1 Tab by mouth every six (6) hours as needed for Pain for up to 3 days. Max Daily Amount: 4 Tabs.   Qty: 12 Tab, Refills: 0    Associated Diagnoses: Complicated puncture wound of finger, initial encounter      cephALEXin (KEFLEX) 500 mg capsule Take 1 Cap by mouth four (4) times daily for 7 days. Qty: 28 Cap, Refills: 0                 Diagnosis     Clinical Impression:   1. Complicated puncture wound of finger, initial encounter    2. Finger injury, left, initial encounter    3.  Pain in left finger(s)

## 2019-07-02 ENCOUNTER — HOSPITAL ENCOUNTER (OUTPATIENT)
Dept: GENERAL RADIOLOGY | Age: 59
Discharge: HOME | End: 2019-07-02
Payer: COMMERCIAL

## 2019-07-02 DIAGNOSIS — M25.551 RIGHT HIP PAIN: ICD-10-CM

## 2019-07-02 PROCEDURE — 73502 X-RAY EXAM HIP UNI 2-3 VIEWS: CPT

## 2022-03-18 PROBLEM — M79.2 NEURITIS: Status: ACTIVE | Noted: 2017-11-28

## 2022-03-18 PROBLEM — E11.40 TYPE 2 DIABETES MELLITUS WITH DIABETIC NEUROPATHY (HCC): Status: ACTIVE | Noted: 2018-08-14

## 2022-03-19 PROBLEM — M47.816 LUMBAR FACET ARTHROPATHY: Status: ACTIVE | Noted: 2017-11-28

## 2022-03-19 PROBLEM — M43.16 SPONDYLOLISTHESIS AT L4-L5 LEVEL: Status: ACTIVE | Noted: 2017-11-28

## 2022-03-19 PROBLEM — M62.830 MUSCLE SPASM OF BACK: Status: ACTIVE | Noted: 2017-11-28

## 2022-03-20 PROBLEM — E66.01 OBESITY, MORBID (HCC): Status: ACTIVE | Noted: 2018-08-14

## 2023-04-20 ENCOUNTER — HOSPITAL ENCOUNTER (OUTPATIENT)
Facility: HOSPITAL | Age: 63
Discharge: HOME | End: 2023-04-23

## 2023-04-20 ENCOUNTER — OFFICE VISIT (OUTPATIENT)
Age: 63
End: 2023-04-20

## 2023-04-20 VITALS — WEIGHT: 286 LBS | BODY MASS INDEX: 38.79 KG/M2

## 2023-04-20 DIAGNOSIS — G57.03 PIRIFORMIS SYNDROME OF BOTH SIDES: ICD-10-CM

## 2023-04-20 DIAGNOSIS — G57.03 PIRIFORMIS SYNDROME OF BOTH SIDES: Primary | ICD-10-CM

## 2023-04-20 NOTE — PROGRESS NOTES
HISTORY OF PRESENT ILLNESS    Stacy Ivan 1960 is a 60y.o. year old male comes in today as new patient for: pain hips    Patients symptoms have been present for 4 or so years. Pain level 2/10 posterior/lateral. It has worsened with walking. Patient has tried: gabapentin 300mg for back and ibuprofen 800mg TID rest with benefit. It is described as pain lateral hips w/o known cause. Was told OA prior. Disability 2018 for lumbar issues. IMAGING: XR left and right hip pending    XR left hip 2019 images reviewed and agree with:  IMPRESSION: No significant abnormalities of either hip or pelvis. Past Surgical History:   Procedure Laterality Date    CORONARY ARTERY BYPASS GRAFT  2019    Procedure: CABG (CORONARY ARTERY BYPASS GRAFT); Surgeon: Tj Melo MD    ORTHOPEDIC SURGERY      \"back surgery\"    OTHER SURGICAL HISTORY      inc and drainage perirectal abscess     Social History     Socioeconomic History    Marital status:      Spouse name: None    Number of children: None    Years of education: None    Highest education level: None   Tobacco Use    Smoking status: Former     Types: Cigarettes     Start date: 1974     Quit date: 1998     Years since quittin.6    Smokeless tobacco: Former   Substance and Sexual Activity    Alcohol use: Yes    Drug use: Never      Current Outpatient Medications   Medication Sig Dispense Refill    tamsulosin (FLOMAX) 0.4 MG capsule Take 2 capsules by mouth daily after dinner. 180 capsule 3    vibegron (GEMTESA) 75 MG TABS tablet Take 1 tablet by mouth daily 30 tablet 5    acetaminophen (TYLENOL) 500 MG tablet Take by mouth every 6 hours as needed      gabapentin (NEURONTIN) 300 MG capsule Take 1-2 caps po q am and 2 caps po q pm as directed.       glimepiride (AMARYL) 4 MG tablet Take 4 mg by mouth      ibuprofen (ADVIL;MOTRIN) 800 MG tablet Take 800 mg by mouth 2 times daily as needed      metFORMIN, MOD, (GLUMETZA) 500 MG extended

## 2023-04-20 NOTE — PROGRESS NOTES
AVS reviewed: yes,   HEP: AT reviewed  Resources Provided: yes,  YouTube videos  Patient questions/concerns answered: yes,   Patient verbalized understanding of treatment plan: yes,

## 2023-05-17 ENCOUNTER — OFFICE VISIT (OUTPATIENT)
Age: 63
End: 2023-05-17

## 2023-05-17 VITALS — WEIGHT: 289 LBS | BODY MASS INDEX: 39.2 KG/M2

## 2023-05-17 DIAGNOSIS — G57.03 PIRIFORMIS SYNDROME OF BOTH SIDES: Primary | ICD-10-CM

## 2023-05-17 RX ORDER — PREDNISONE 20 MG/1
TABLET ORAL
Qty: 22 TABLET | Refills: 0 | Status: SHIPPED | OUTPATIENT
Start: 2023-05-17 | End: 2023-06-01

## 2023-05-17 NOTE — PROGRESS NOTES
AVS reviewed: yes,   HEP: N/A  Resources Provided: no   Patient questions/concerns answered: yes,   Patient verbalized understanding of treatment plan: yes,
caps po q pm as directed. glimepiride (AMARYL) 4 MG tablet Take 4 mg by mouth      ibuprofen (ADVIL;MOTRIN) 800 MG tablet Take 800 mg by mouth 2 times daily as needed      metFORMIN, MOD, (GLUMETZA) 500 MG extended release tablet Take 500 mg by mouth daily      methocarbamol (ROBAXIN) 500 MG tablet 1 po bid -tid as needed for muscle spasm  Indications: Muscle Spasm      topiramate (TOPAMAX) 25 MG tablet Take 75 mg by mouth 2 times daily      traZODone (DESYREL) 50 MG tablet Take 50 mg by mouth       No current facility-administered medications for this visit. Past Medical History:   Diagnosis Date    Atrial fibrillation (HCC)     BPH (benign prostatic hyperplasia)     Colon polyp     DDD (degenerative disc disease), cervical     Diabetes mellitus ([facility]     Essential hypertension, benign     History of echocardiogram 09/05/2014    EF 55%. No WMA. Mild LVH. Gr 1 DDfx. RVSP 20 mmHg. No significant valvular heart disease. Unchanged from study of 11/23/10. History of myocardial perfusion scan 03/15/2013    Mild, fixed inferobasal defect likely artifact. No WMA. EF 60%. Holter monitor, abnormal 09/24/2016    Sinus rhythm. Avg HR 71 bpm (range  bpm). One 4-beat run of SVT. No pause >2.1 secs. Musculoskeletal disorder     ruptured disc    Obesity, unspecified     Other and unspecified hyperlipidemia     Other malaise and fatigue     PAF (paroxysmal atrial fibrillation) ([facility])     Sleep apnea     cpap    Type II or unspecified type diabetes mellitus without mention of complication, not stated as uncontrolled     Unspecified sleep apnea      Family History   Problem Relation Age of Onset    Melanoma Father     Coronary Art Dis Father          ROS:  + neuropathy feet, no incont, fever    Objective: Wt 289 lb (131.1 kg)   BMI 39.20 kg/m²   NEURO:  Sensation intact to light touch. Reflexes +2/4 patellar and Achilles bilaterally. M/S: Examined standing and supine. Slump negative.  Standing

## 2023-10-02 DIAGNOSIS — G57.03 PIRIFORMIS SYNDROME OF BOTH SIDES: ICD-10-CM

## 2023-10-05 DIAGNOSIS — G57.03 PIRIFORMIS SYNDROME OF BOTH SIDES: ICD-10-CM

## 2023-10-10 DIAGNOSIS — G57.03 PIRIFORMIS SYNDROME OF BOTH SIDES: ICD-10-CM

## 2023-10-11 ENCOUNTER — TELEPHONE (OUTPATIENT)
Age: 63
End: 2023-10-11

## 2023-10-11 NOTE — TELEPHONE ENCOUNTER
Patient walked into the office questioning  about his medication:   diclofenac (VOLTAREN) 50 MG EC tablet. I advised patient that he would need to make an appointment since his last office visit was June 12, 23, \"he stated he is on disability and he doesn't have the money to pay his co-pay; however he kept insisting that I put in a refill for his medication.      I advised patient again per \"Dr Muñiz Gabino he would need to make an appointment\"; patient raised his voice a little as patients were in the office, I again asked the patient if he would like to make an appointment and again he stated he doesn't have money for the co-pay; and he will be looking for another doctor as he was walking out the office \"he did state as he was leaving \"speaking to the patients saying your heard that right\" and left

## 2024-06-13 RX ORDER — ASPIRIN 81 MG/1
81 TABLET ORAL DAILY
COMMUNITY
Start: 2022-05-23

## 2024-06-13 RX ORDER — PRAVASTATIN SODIUM 40 MG
40 TABLET ORAL
COMMUNITY
Start: 2024-03-27

## 2024-06-13 RX ORDER — DULAGLUTIDE 3 MG/.5ML
3 INJECTION, SOLUTION SUBCUTANEOUS
COMMUNITY
Start: 2023-05-18

## 2024-06-13 RX ORDER — LOSARTAN POTASSIUM 25 MG/1
25 TABLET ORAL NIGHTLY
COMMUNITY
Start: 2023-10-05

## 2024-06-13 RX ORDER — NITROGLYCERIN 0.4 MG/1
0.4 TABLET SUBLINGUAL
COMMUNITY

## 2024-06-13 NOTE — PROGRESS NOTES
Instructions for your procedure at [facility]      Today's Date: 6/13/2024      Patient's Name: Ashwin Werner      Procedure Date: June 25        Please enter the main entrance of the hospital and check-in at the  located in the lobby.      Do NOT eat or drink anything, including candy, gum, or ice chips after midnight prior to your procedure, unless it is part of your prep.  Brush your teeth before coming to the hospital.You may swish with water, but do not swallow.  No smoking/Vaping/E-Cigarettes 24 hours prior to the day of procedure.  No alcohol 24 hours prior to the day of procedure.  No recreational drugs for one week prior to the day of procedure.  Bring Photo ID, Insurance information, and Co-pay if required on day of procedure.  Bring in pertinent legal documents, such as, Medical Power of , DNR, Advance Directive, etc.  Leave all other valuables, including money/purse, at home.  Remove jewelry, including ALL body piercings, nail polish, acrylic nails, and makeup (including mascara); no lotions, powders, deodorant, and/or perfume/cologne/after shave on the skin.  Glasses and dentures may be worn to the hospital.  They must be removed prior to procedure. Please bring case/container for glasses or dentures.  11. Contacts should not be worn on day of procedure.   12. Call the office ([phone]) if you have symptoms of a cold or illness within 24-48 hours prior to your procedure.   13. AN ADULT (relative or friend 18 years or older) MUST DRIVE YOU HOME AFTER YOUR PROCEDURE.   14. Please make arrangements for a responsible adult (18 years or older) to be with you for 24 hours after your procedure.   15. ONE VISITOR will be allowed in the waiting area during your procedure.       Special Instructions:      Bring list of CURRENT medications.  Follow instructions from the office regarding Bowel Prep, Vitamins, Iron, Blood Thinners, Insulin, Seizure, and Blood

## 2024-06-24 ENCOUNTER — ANESTHESIA EVENT (OUTPATIENT)
Facility: HOSPITAL | Age: 64
End: 2024-06-24
Payer: COMMERCIAL

## 2024-06-25 ENCOUNTER — ANESTHESIA (OUTPATIENT)
Facility: HOSPITAL | Age: 64
End: 2024-06-25
Payer: COMMERCIAL

## 2024-06-25 ENCOUNTER — HOSPITAL ENCOUNTER (OUTPATIENT)
Facility: HOSPITAL | Age: 64
Setting detail: OUTPATIENT SURGERY
Discharge: HOME | End: 2024-06-25
Attending: INTERNAL MEDICINE | Admitting: INTERNAL MEDICINE
Payer: COMMERCIAL

## 2024-06-25 VITALS
BODY MASS INDEX: 35.34 KG/M2 | TEMPERATURE: 97.6 F | SYSTOLIC BLOOD PRESSURE: 130 MMHG | DIASTOLIC BLOOD PRESSURE: 57 MMHG | HEIGHT: 72 IN | OXYGEN SATURATION: 96 % | RESPIRATION RATE: 16 BRPM | WEIGHT: 260.9 LBS | HEART RATE: 60 BPM

## 2024-06-25 LAB
GLUCOSE BLD STRIP.AUTO-MCNC: 111 MG/DL (ref 70–110)
GLUCOSE BLD STRIP.AUTO-MCNC: 126 MG/DL (ref 70–110)

## 2024-06-25 PROCEDURE — 2580000003 HC RX 258: Performed by: NURSE ANESTHETIST, CERTIFIED REGISTERED

## 2024-06-25 PROCEDURE — 88341 IMHCHEM/IMCYTCHM EA ADD ANTB: CPT

## 2024-06-25 PROCEDURE — 7100000010 HC PHASE II RECOVERY - FIRST 15 MIN: Performed by: INTERNAL MEDICINE

## 2024-06-25 PROCEDURE — 3600007502: Performed by: INTERNAL MEDICINE

## 2024-06-25 PROCEDURE — 88342 IMHCHEM/IMCYTCHM 1ST ANTB: CPT

## 2024-06-25 PROCEDURE — 6360000002 HC RX W HCPCS: Performed by: NURSE ANESTHETIST, CERTIFIED REGISTERED

## 2024-06-25 PROCEDURE — 82962 GLUCOSE BLOOD TEST: CPT

## 2024-06-25 PROCEDURE — 3600007512: Performed by: INTERNAL MEDICINE

## 2024-06-25 PROCEDURE — 88305 TISSUE EXAM BY PATHOLOGIST: CPT

## 2024-06-25 PROCEDURE — 3700000001 HC ADD 15 MINUTES (ANESTHESIA): Performed by: INTERNAL MEDICINE

## 2024-06-25 PROCEDURE — 2709999900 HC NON-CHARGEABLE SUPPLY: Performed by: INTERNAL MEDICINE

## 2024-06-25 PROCEDURE — 7100000000 HC PACU RECOVERY - FIRST 15 MIN: Performed by: INTERNAL MEDICINE

## 2024-06-25 PROCEDURE — 2500000003 HC RX 250 WO HCPCS: Performed by: NURSE ANESTHETIST, CERTIFIED REGISTERED

## 2024-06-25 PROCEDURE — 3700000000 HC ANESTHESIA ATTENDED CARE: Performed by: INTERNAL MEDICINE

## 2024-06-25 RX ORDER — LIDOCAINE HYDROCHLORIDE 20 MG/ML
INJECTION, SOLUTION EPIDURAL; INFILTRATION; INTRACAUDAL; PERINEURAL PRN
Status: DISCONTINUED | OUTPATIENT
Start: 2024-06-25 | End: 2024-06-25 | Stop reason: SDUPTHER

## 2024-06-25 RX ORDER — PROPOFOL 10 MG/ML
INJECTION, EMULSION INTRAVENOUS PRN
Status: DISCONTINUED | OUTPATIENT
Start: 2024-06-25 | End: 2024-06-25 | Stop reason: SDUPTHER

## 2024-06-25 RX ORDER — SODIUM CHLORIDE, SODIUM LACTATE, POTASSIUM CHLORIDE, CALCIUM CHLORIDE 600; 310; 30; 20 MG/100ML; MG/100ML; MG/100ML; MG/100ML
INJECTION, SOLUTION INTRAVENOUS CONTINUOUS
Status: DISCONTINUED | OUTPATIENT
Start: 2024-06-25 | End: 2024-06-25 | Stop reason: HOSPADM

## 2024-06-25 RX ORDER — INSULIN LISPRO 100 [IU]/ML
0-15 INJECTION, SOLUTION INTRAVENOUS; SUBCUTANEOUS ONCE
Status: DISCONTINUED | OUTPATIENT
Start: 2024-06-25 | End: 2024-06-25 | Stop reason: HOSPADM

## 2024-06-25 RX ORDER — FAMOTIDINE 20 MG/1
20 TABLET, FILM COATED ORAL ONCE
Status: DISCONTINUED | OUTPATIENT
Start: 2024-06-25 | End: 2024-06-25 | Stop reason: HOSPADM

## 2024-06-25 RX ORDER — SODIUM CHLORIDE, SODIUM LACTATE, POTASSIUM CHLORIDE, CALCIUM CHLORIDE 600; 310; 30; 20 MG/100ML; MG/100ML; MG/100ML; MG/100ML
INJECTION, SOLUTION INTRAVENOUS CONTINUOUS PRN
Status: DISCONTINUED | OUTPATIENT
Start: 2024-06-25 | End: 2024-06-25 | Stop reason: SDUPTHER

## 2024-06-25 RX ORDER — LIDOCAINE HYDROCHLORIDE 10 MG/ML
1 INJECTION, SOLUTION EPIDURAL; INFILTRATION; INTRACAUDAL; PERINEURAL
Status: DISCONTINUED | OUTPATIENT
Start: 2024-06-25 | End: 2024-06-25 | Stop reason: HOSPADM

## 2024-06-25 RX ORDER — SODIUM CHLORIDE 0.9 % (FLUSH) 0.9 %
5-40 SYRINGE (ML) INJECTION PRN
Status: DISCONTINUED | OUTPATIENT
Start: 2024-06-25 | End: 2024-06-25 | Stop reason: HOSPADM

## 2024-06-25 RX ORDER — DEXTROSE MONOHYDRATE 100 MG/ML
INJECTION, SOLUTION INTRAVENOUS CONTINUOUS PRN
Status: DISCONTINUED | OUTPATIENT
Start: 2024-06-25 | End: 2024-06-25 | Stop reason: HOSPADM

## 2024-06-25 RX ADMIN — SODIUM CHLORIDE, SODIUM LACTATE, POTASSIUM CHLORIDE, AND CALCIUM CHLORIDE: 600; 310; 30; 20 INJECTION, SOLUTION INTRAVENOUS at 14:30

## 2024-06-25 RX ADMIN — PROPOFOL 50 MG: 10 INJECTION, EMULSION INTRAVENOUS at 14:34

## 2024-06-25 RX ADMIN — PROPOFOL 40 MG: 10 INJECTION, EMULSION INTRAVENOUS at 14:46

## 2024-06-25 RX ADMIN — SODIUM CHLORIDE, POTASSIUM CHLORIDE, SODIUM LACTATE AND CALCIUM CHLORIDE: 600; 310; 30; 20 INJECTION, SOLUTION INTRAVENOUS at 13:06

## 2024-06-25 RX ADMIN — LIDOCAINE HYDROCHLORIDE 50 MG: 20 INJECTION, SOLUTION EPIDURAL; INFILTRATION; INTRACAUDAL; PERINEURAL at 14:34

## 2024-06-25 ASSESSMENT — PAIN - FUNCTIONAL ASSESSMENT: PAIN_FUNCTIONAL_ASSESSMENT: 0-10

## 2024-06-25 ASSESSMENT — PAIN SCALES - GENERAL: PAINLEVEL_OUTOF10: 0

## 2024-06-25 NOTE — ANESTHESIA POSTPROCEDURE EVALUATION
Department of Anesthesiology  Postprocedure Note    Patient: Ashwin Werner  MRN: [id number]  YOB: 1960  Date of evaluation: 6/25/2024    Procedure Summary       Date: 06/25/24 Room / Location: [facility] ENDO 02 / [facility] ENDOSCOPY    Anesthesia Start: 1430 Anesthesia Stop: 1457    Procedure: COLONOSCOPY w/Biopsies & Polypectomy (Abdomen) Diagnosis:       Lower GI bleed      Personal history of colonic polyps      S/P CABG (coronary artery bypass graft)      (Lower GI bleed [K92.2])      (Personal history of colonic polyps [Z86.010])      (S/P CABG (coronary artery bypass graft) [Z95.1])    Surgeons: Hank Hernandez MD Responsible Provider: Sabiha Perez MD    Anesthesia Type: TIVA, MAC ASA Status: 3            Anesthesia Type: TIVA, MAC    Raúl Phase I: Raúl Score: 10    Raúl Phase II: Raúl Score: 10    Anesthesia Post Evaluation    Patient location during evaluation: PACU  Patient participation: complete - patient participated  Level of consciousness: awake and alert  Pain score: 0  Airway patency: patent  Nausea & Vomiting: no nausea and no vomiting  Cardiovascular status: hemodynamically stable  Respiratory status: acceptable  Hydration status: euvolemic  Multimodal analgesia pain management approach  Pain management: adequate    No notable events documented.

## 2024-06-25 NOTE — ANESTHESIA PRE PROCEDURE
Department of Anesthesiology  Preprocedure Note       Name:  Ashwin Werner   Age:  63 y.o.  :  1960                                          MRN:  [id number]         Date:  2024      Surgeon: Surgeon(s):  Hank Hernandez MD    Procedure: Procedure(s):  COLONOSCOPY    Medications prior to admission:   Prior to Admission medications    Medication Sig Start Date End Date Taking? Authorizing Provider   Dulaglutide (TRULICITY) 3 MG/0.5ML SOPN Inject 3 mg into the skin every 7 days Every 23  Yes Provider, MD Yoly   aspirin 81 MG EC tablet Take 1 tablet by mouth daily 22  Yes Provider, MD Yoly   losartan (COZAAR) 25 MG tablet Take 1 tablet by mouth at bedtime 10/5/23  Yes Provider, MD Yoly   metoprolol tartrate (LOPRESSOR) 25 MG tablet Take 1 tablet by mouth 2 times daily 3/14/24  Yes Provider, MD Yoly   pravastatin (PRAVACHOL) 40 MG tablet Take 1 tablet by mouth nightly 3/27/24  Yes Provider, MD Yoly   nitroGLYCERIN (NITROSTAT) 0.4 MG SL tablet Place 1 tablet under the tongue    Provider, MD Yoly   diclofenac (VOLTAREN) 50 MG EC tablet Take 1 tablet by mouth with breakfast and with evening meal As needed pain. 23   Roldan Rushing DO   tamsulosin (FLOMAX) 0.4 MG capsule Take 2 capsules by mouth daily after dinner. 4/10/23   Nima Griffin MD   gabapentin (NEURONTIN) 300 MG capsule Take 1 capsule by mouth nightly. 17   Automatic Reconciliation, Ar   ibuprofen (ADVIL;MOTRIN) 800 MG tablet Take 1 tablet by mouth 2 times daily as needed    Automatic Reconciliation, Ar       Current medications:    Current Facility-Administered Medications   Medication Dose Route Frequency Provider Last Rate Last Admin   • lidocaine PF 1 % injection 1 mL  1 mL IntraDERmal Once PRN Ramon Park APRN - CRNA       • famotidine (PEPCID) tablet 20 mg  20 mg Oral Once Ramon Park APRN - CRNA       • lactated ringers IV soln infusion

## 2024-06-25 NOTE — DISCHARGE INSTRUCTIONS
process. If you do not sign up before the expiration date, you must request a new code.    [employer]ets Access Code: Activation code not generated  Current Sportmeets Status: Active (This is the date your Sportmeets access code will )    Enter the last four digits of your Social Security Number (xxxx) and Date of Birth (mm/dd/yyyy) as indicated and click Submit. You will be taken to the next sign-up page.  Create a Sportmeets ID. This will be your Sportmeets login ID and cannot be changed, so think of one that is secure and easy to remember.  Create a Sportmeets password. You can change your password at any time.  Enter your Password Reset Question and Answer. This can be used at a later time if you forget your password.   Enter your e-mail address. You will receive e-mail notification when new information is available in [employer]meets.  Click Sign Up. You can now view and download portions of your medical record.  Click the Download Summary menu link to download a portable copy of your medical information.    Additional Information    If you have questions, please call [phone]. Remember, Sportmeets is NOT to be used for urgent needs. For medical emergencies, dial 911.    Educational references and/or instructions provided during this visit included:    See Attached      APPOINTMENTS:    Per MD Instruction    Discharge information has been reviewed with the patient.  The patient verbalized understanding.      Colon Polyps: Care Instructions  Your Care Instructions     Colon polyps are growths in the colon or the rectum. The cause of most colon polyps is not known, and most people who get them do not have any problems. But a certain kind can turn into cancer. For this reason, regular testing for colon polyps is important for people as they get older. It is also important for anyone who has an increased risk for colon cancer.  Polyps are usually found through routine colon cancer screening tests. Although most colon polyps are not

## 2024-06-26 ENCOUNTER — TELEPHONE (OUTPATIENT)
Age: 64
End: 2024-06-26

## 2024-06-26 NOTE — TELEPHONE ENCOUNTER
Please make appt for July 1, 2024 with Dr Thomas  Reason for appt is referral from Kimberli Jackson  Mass in rectum  Referral in workqueue  LM

## (undated) DEVICE — CANNULA ORIG TL CLR W FOAM CUSHIONS AND 14FT SUPL TB 3 CHN

## (undated) DEVICE — CATHETER SCLERO L240CM NDL 25GA L4MM SHTH DIA2.3MM CNTRST

## (undated) DEVICE — SNARE VASC L240CM LOOP W10MM SHTH DIA2.4MM RND STIFF CLD

## (undated) DEVICE — SYRINGE MED 10ML LUERLOCK TIP W/O SFTY DISP

## (undated) DEVICE — SYRINGE MED 50ML LUERLOCK TIP

## (undated) DEVICE — SYRINGE MED 25GA 3ML L5/8IN SUBQ PLAS W/ DETACH NDL SFTY

## (undated) DEVICE — SYRINGE MED 50ML LUERSLIP TIP

## (undated) DEVICE — BASIN EMSIS 16OZ GRAPHITE PLAS KID SHP MOLD GRAD FOR ORAL

## (undated) DEVICE — UNDERPAD INCONT W23XL36IN STD BLU POLYPR BK FLUF SFT

## (undated) DEVICE — MEDI-VAC NON-CONDUCTIVE SUCTION TUBING: Brand: CARDINAL HEALTH

## (undated) DEVICE — GAUZE,SPONGE,4"X4",16PLY,STRL,LF,10/TRAY: Brand: MEDLINE

## (undated) DEVICE — TRAP EVAC NO 5500 DISPOS-A-TRAP

## (undated) DEVICE — FORCEPS BX L240CM JAW DIA2.4MM ORNG L CAP W/ NDL DISP RAD